# Patient Record
Sex: FEMALE | Race: BLACK OR AFRICAN AMERICAN | Employment: FULL TIME | URBAN - METROPOLITAN AREA
[De-identification: names, ages, dates, MRNs, and addresses within clinical notes are randomized per-mention and may not be internally consistent; named-entity substitution may affect disease eponyms.]

---

## 2021-03-31 ENCOUNTER — HOSPITAL ENCOUNTER (EMERGENCY)
Age: 36
Discharge: HOME | End: 2021-03-31
Attending: EMERGENCY MEDICINE
Payer: COMMERCIAL

## 2021-03-31 DIAGNOSIS — G50.1 ATYPICAL FACE PAIN: Primary | ICD-10-CM

## 2021-03-31 DIAGNOSIS — K08.89 DENTALGIA: ICD-10-CM

## 2021-03-31 PROCEDURE — 99281 EMR DPT VST MAYX REQ PHY/QHP: CPT

## 2021-03-31 RX ORDER — HYDROCODONE BITARTRATE AND ACETAMINOPHEN 5; 325 MG/1; MG/1
1 TABLET ORAL
Qty: 8 TAB | Refills: 0 | Status: SHIPPED | OUTPATIENT
Start: 2021-03-31 | End: 2021-04-03

## 2021-03-31 RX ORDER — IBUPROFEN 600 MG/1
600 TABLET ORAL
Qty: 15 TAB | Refills: 0 | Status: SHIPPED | OUTPATIENT
Start: 2021-03-31 | End: 2022-01-20

## 2021-03-31 RX ORDER — CLINDAMYCIN HYDROCHLORIDE 300 MG/1
300 CAPSULE ORAL 4 TIMES DAILY
Qty: 40 CAP | Refills: 0 | Status: SHIPPED | OUTPATIENT
Start: 2021-03-31 | End: 2021-04-10

## 2021-03-31 NOTE — LETTER
[address] 
[facility] EMERGENCY DEPT 
[address] [place] [id number] [phone] Work/School Note Date: 3/31/2021 To Whom It May concern: 
 
 
Hermelinda Joshua was seen and treated today in the emergency room. She may return to work in 1 to 2 days, as symptoms improve. Sincerely, Adele Curtis

## 2021-03-31 NOTE — ED PROVIDER NOTES
EMERGENCY DEPARTMENT HISTORY AND PHYSICAL EXAM      Date: 3/31/2021  Patient Name: Fabian Alvarez    History of Presenting Illness     Chief Complaint   Patient presents with    Dental Pain     left upper quadrant onset weeks- has appt with dental on Friday       History Provided By: Patient    HPI: Fabian Alvarez, 31 y.o. female presents ambulatory to the Emergency Dept with c/o dental pain to the L upper molar region since Friday, 3/26/21. The patient denied h/o problems with this tooth in the past. She states she has an appt with her dentist for this Friday, but could no longer tolerate the pain. She denied facial swelling. No bleeding/drainage from the tooth. No fever/chills. She denied difficulty swallowing/breathing. Pt denied tobacco use. She described her pain as an acute throbbing, ache and rated it as severe. Pt is o/w healthy without cough, congestion, ST, shortness of breath, chest pain, N/V/D. There are no other complaints, changes, or physical findings at this time. PCP: None    Current Outpatient Medications   Medication Sig Dispense Refill    clindamycin (CLEOCIN) 300 mg capsule Take 1 Cap by mouth four (4) times daily for 10 days. 40 Cap 0    HYDROcodone-acetaminophen (NORCO) 5-325 mg per tablet Take 1 Tab by mouth every eight (8) hours as needed for Pain for up to 3 days. Max Daily Amount: 3 Tabs. 8 Tab 0    ibuprofen (MOTRIN) 600 mg tablet Take 1 Tab by mouth every six (6) hours as needed for Pain for up to 15 doses. 15 Tab 0    predniSONE (STERAPRED DS) 10 mg dose pack TAD. 21 Tab 0    albuterol (PROVENTIL, VENTOLIN) 90 mcg/actuation inhaler Take 1-2 Puffs by inhalation every four (4) hours as needed for Wheezing.  17 g 0       Past History     Past Medical History:  Past Medical History:   Diagnosis Date    Asthma     Other ill-defined conditions(869.89)     eczema       Past Surgical History:  Past Surgical History:   Procedure Laterality Date    MO ABDOMEN SURGERY PROC UNLISTED      hernia       Family History:  History reviewed. No pertinent family history. Social History:  Social History     Tobacco Use    Smoking status: Never Smoker   Substance Use Topics    Alcohol use: No    Drug use: No       Allergies:  No Known Allergies      Review of Systems   Review of Systems   Constitutional: Negative for chills and fever. HENT: Positive for dental problem. Negative for congestion, drooling, ear pain, facial swelling, rhinorrhea, sore throat and trouble swallowing. Eyes: Negative for discharge, redness and itching. Respiratory: Negative for cough and shortness of breath. Cardiovascular: Negative for chest pain and palpitations. Gastrointestinal: Negative for abdominal pain, diarrhea, nausea and vomiting. Genitourinary: Negative for dysuria and hematuria. Musculoskeletal: Negative for neck pain and neck stiffness. Skin: Negative for color change, pallor, rash and wound. Allergic/Immunologic: Negative for food allergies and immunocompromised state. Neurological: Negative for dizziness and headaches. Hematological: Negative for adenopathy. Does not bruise/bleed easily. Psychiatric/Behavioral: Negative for agitation and confusion. All other systems reviewed and are negative. Physical Exam   Physical Exam  Vitals signs and nursing note reviewed. Constitutional:       General: She is not in acute distress. Appearance: She is well-developed. She is obese. She is not ill-appearing, toxic-appearing or diaphoretic. HENT:      Head: Normocephalic and atraumatic. Nose: Nose normal. No congestion or rhinorrhea. Mouth/Throat:      Mouth: Mucous membranes are moist.      Pharynx: No oropharyngeal exudate. Comments: Pt with poor overall dental health, multiple dental caries, decayed/missing teeth. Tender to L upper molar which was notably decayed. No gingival erythema, edema, exudate, or bleeding noted.   Eyes:      General: No scleral icterus. Right eye: No discharge. Left eye: No discharge. Conjunctiva/sclera: Conjunctivae normal.   Neck:      Musculoskeletal: Normal range of motion and neck supple. Thyroid: No thyromegaly. Vascular: No JVD. Trachea: No tracheal deviation. Cardiovascular:      Rate and Rhythm: Normal rate and regular rhythm. Heart sounds: Normal heart sounds. Pulmonary:      Effort: Pulmonary effort is normal. No respiratory distress. Breath sounds: Normal breath sounds. No wheezing. Musculoskeletal: Normal range of motion. General: No deformity. Lymphadenopathy:      Cervical: No cervical adenopathy. Skin:     General: Skin is warm and dry. Findings: No erythema. Neurological:      General: No focal deficit present. Mental Status: She is alert and oriented to person, place, and time. Sensory: No sensory deficit. Motor: No abnormal muscle tone. Coordination: Coordination normal.   Psychiatric:         Mood and Affect: Mood normal.         Behavior: Behavior normal.         Judgment: Judgment normal.         Diagnostic Study Results     Labs -   No results found for this or any previous visit (from the past 12 hour(s)). Radiologic Studies -   No orders to display         Medical Decision Making   I am the first provider for this patient. I reviewed the vital signs, available nursing notes, past medical history, past surgical history, family history and social history. Vital Signs-Reviewed the patient's vital signs. No data found. Records Reviewed: Nursing Notes, Old Medical Records, Previous Radiology Studies and Previous Laboratory Studies    Provider Notes (Medical Decision Making):   Dental caries, dental abscess, exposed nerve root    ED Course:   Initial assessment performed. The patients presenting problems have been discussed, and they are in agreement with the care plan formulated and outlined with them.   I have encouraged them to ask questions as they arise throughout their visit. DISCHARGE NOTE:  The care plan has been outline with the patient and/or family, who verbally conveyed understanding and agreement. Available results have been reviewed. Patient and/or family understand the follow up plan as outlined and discharge instructions. Should their condition deterioration at any time after discharge the patient agrees to return, follow up sooner than outlined or seek medical assistance at the closest Emergency Room as soon as possible. Questions have been answered. Discharge instructions and educational information regarding the patient's diagnosis as well a list of reasons why the patient would want to seek immediate medical attention, should their condition change, were reviewed directly with the patient/family        PLAN:  1. Discharge Medication List as of 3/31/2021  5:49 PM      START taking these medications    Details   clindamycin (CLEOCIN) 300 mg capsule Take 1 Cap by mouth four (4) times daily for 10 days. , Normal, Disp-40 Cap, R-0      HYDROcodone-acetaminophen (NORCO) 5-325 mg per tablet Take 1 Tab by mouth every eight (8) hours as needed for Pain for up to 3 days. Max Daily Amount: 3 Tabs., Normal, Disp-8 Tab, R-0      ibuprofen (MOTRIN) 600 mg tablet Take 1 Tab by mouth every six (6) hours as needed for Pain for up to 15 doses. , Normal, Disp-15 Tab, R-0         CONTINUE these medications which have NOT CHANGED    Details   predniSONE (STERAPRED DS) 10 mg dose pack TAD., Print, Disp-21 Tab, R-0      albuterol (PROVENTIL, VENTOLIN) 90 mcg/actuation inhaler Take 1-2 Puffs by inhalation every four (4) hours as needed for Wheezing. Print, 1-2 Puff, Disp-17 g, R-0           2.    Follow-up Information     Follow up With Specialties Details Why Contact Info    your dentist        [facility] EMERGENCY DEPT Emergency Medicine  If symptoms worsen [address]  [address] [facility]  [phone]        Return to ED if worse     Diagnosis     Clinical Impression:   1. Atypical face pain    2.  Levie Wilson

## 2021-03-31 NOTE — DISCHARGE INSTRUCTIONS
Complete all of your antibiotics as prescribed. Follow up with Dentist as scheduled. Return to the Emergency Dept for any worsening pain, swelling, fever, difficulty swallowing/breathing.

## 2021-05-21 ENCOUNTER — HOSPITAL ENCOUNTER (EMERGENCY)
Age: 36
Discharge: HOME | End: 2021-05-21
Attending: EMERGENCY MEDICINE
Payer: COMMERCIAL

## 2021-05-21 VITALS
TEMPERATURE: 98.6 F | BODY MASS INDEX: 50.61 KG/M2 | WEIGHT: 268.08 LBS | OXYGEN SATURATION: 100 % | RESPIRATION RATE: 18 BRPM | HEART RATE: 100 BPM | HEIGHT: 61 IN

## 2021-05-21 DIAGNOSIS — K08.89 DENTALGIA: Primary | ICD-10-CM

## 2021-05-21 PROCEDURE — 99283 EMERGENCY DEPT VISIT LOW MDM: CPT

## 2021-05-21 PROCEDURE — 74011250637 HC RX REV CODE- 250/637: Performed by: PHYSICIAN ASSISTANT

## 2021-05-21 PROCEDURE — 74011000250 HC RX REV CODE- 250: Performed by: PHYSICIAN ASSISTANT

## 2021-05-21 RX ORDER — PENICILLIN V POTASSIUM 500 MG/1
500 TABLET, FILM COATED ORAL 4 TIMES DAILY
Qty: 28 TABLET | Refills: 0 | Status: SHIPPED | OUTPATIENT
Start: 2021-05-21 | End: 2021-05-28

## 2021-05-21 RX ADMIN — BENZOCAINE, BUTAMBEN, AND TETRACAINE HYDROCHLORIDE: .028; .004; .004 AEROSOL, SPRAY TOPICAL at 09:36

## 2021-05-21 NOTE — ED PROVIDER NOTES
EMERGENCY DEPARTMENT HISTORY AND PHYSICAL EXAM      Date: 5/21/2021  Patient Name: Kaylee Franklin    History of Presenting Illness     Chief Complaint   Patient presents with    Dental Pain     Terrible toothache - top left       History Provided By: Patient    HPI: Kaylee Franklin, 31 y.o. female with PMHx of asthma, eczema, presents BIB self to the ED with cc of dental pain since yesterday. Pain is located at the L upper molar, 2nd from the back. Pain  Is described as sharp, worse to the touch and with cold temperatures. Pt denies fever, drainage, swelling, difficulty swallowing. Dentist is [facility] and pt has an appt scheduled in 6 days. No improvement with ibuprofen x2 this morning at 6:30. There are no other complaints, changes, or physical findings at this time. PCP: None    No current facility-administered medications on file prior to encounter. Current Outpatient Medications on File Prior to Encounter   Medication Sig Dispense Refill    ibuprofen (MOTRIN) 600 mg tablet Take 1 Tab by mouth every six (6) hours as needed for Pain for up to 15 doses. 15 Tab 0    predniSONE (STERAPRED DS) 10 mg dose pack TAD. 21 Tab 0    albuterol (PROVENTIL, VENTOLIN) 90 mcg/actuation inhaler Take 1-2 Puffs by inhalation every four (4) hours as needed for Wheezing. 17 g 0       Past History     Past Medical History:  Past Medical History:   Diagnosis Date    Asthma     Other ill-defined conditions(799.89)     eczema       Past Surgical History:  Past Surgical History:   Procedure Laterality Date    AR ABDOMEN SURGERY PROC UNLISTED      hernia       Family History:  No family history on file. Social History:  Social History     Tobacco Use    Smoking status: Never Smoker   Substance Use Topics    Alcohol use: No    Drug use: No       Allergies:  No Known Allergies      Review of Systems   Review of Systems   Constitutional: Negative for fever. HENT: Negative for trouble swallowing. Dental pain   Gastrointestinal: Negative for vomiting. Physical Exam   Physical Exam  Vitals and nursing note reviewed. Constitutional:       General: She is not in acute distress. Appearance: Normal appearance. She is well-developed. HENT:      Head: Normocephalic and atraumatic. Comments: TTP over tooth #15. No appreciable fluctuance, edema, or erythema. Airway patent, sublingual region soft. Right Ear: Tympanic membrane normal.      Left Ear: Tympanic membrane normal.      Nose: Nose normal.      Mouth/Throat:      Mouth: Mucous membranes are moist.   Eyes:      General: Lids are normal.      Extraocular Movements: Extraocular movements intact. Conjunctiva/sclera: Conjunctivae normal.      Pupils: Pupils are equal, round, and reactive to light. Cardiovascular:      Rate and Rhythm: Normal rate and regular rhythm. Pulmonary:      Effort: Pulmonary effort is normal.      Breath sounds: Normal breath sounds. Abdominal:      Palpations: Abdomen is soft. Musculoskeletal:         General: Normal range of motion. Cervical back: Normal range of motion and neck supple. Skin:     General: Skin is warm and dry. Neurological:      General: No focal deficit present. Mental Status: She is alert and oriented to person, place, and time. Psychiatric:         Mood and Affect: Mood normal.         Behavior: Behavior normal. Behavior is cooperative. Diagnostic Study Results     Labs -   No results found for this or any previous visit (from the past 12 hour(s)). Radiologic Studies -   No orders to display     CT Results  (Last 48 hours)    None        CXR Results  (Last 48 hours)    None            Medical Decision Making   I am the first provider for this patient. I reviewed the vital signs, available nursing notes, past medical history, past surgical history, family history and social history. Vital Signs-Reviewed the patient's vital signs.   Patient Vitals for the past 12 hrs:   Temp Pulse Resp SpO2   05/21/21 0914 98.6 °F (37 °C) 100 18 100 %       Records Reviewed: Nursing Notes    Provider Notes (Medical Decision Making):   No appreciable abscess. Pt d/c home with a dental ball, course of Ren. Advised pt to keep dentist appt as scheduled for definitive tx. ED return precautions given. ED Course:   Initial assessment performed. The patients presenting problems have been discussed, and they are in agreement with the care plan formulated and outlined with them. I have encouraged them to ask questions as they arise throughout their visit. Critical Care Time: None    Disposition:  D/c    PLAN:  1. Current Discharge Medication List      START taking these medications    Details   penicillin v potassium (VEETID) 500 mg tablet Take 1 Tablet by mouth four (4) times daily for 7 days. Qty: 28 Tablet, Refills: 0  Start date: 5/21/2021, End date: 5/28/2021           2. Follow-up Information     Follow up With Specialties Details Why Contact Info    [facility] EMERGENCY DEPT Emergency Medicine  If symptoms worsen [address]  [address] [facility]  [phone]    Your dentist  Call  For follow up as scheduled         Return to ED if worse     Diagnosis     Clinical Impression:   1. Dentalgia          Please note that this dictation was completed with [employer], the computer voice recognition software. Quite often unanticipated grammatical, syntax, homophones, and other interpretive errors are inadvertently transcribed by the computer software. Please disregards these errors. Please excuse any errors that have escaped final proofreading.

## 2022-01-20 ENCOUNTER — HOSPITAL ENCOUNTER (EMERGENCY)
Age: 37
Discharge: HOME | End: 2022-01-20
Attending: STUDENT IN AN ORGANIZED HEALTH CARE EDUCATION/TRAINING PROGRAM
Payer: COMMERCIAL

## 2022-01-20 ENCOUNTER — APPOINTMENT (OUTPATIENT)
Dept: GENERAL RADIOLOGY | Age: 37
End: 2022-01-20
Attending: PHYSICIAN ASSISTANT
Payer: COMMERCIAL

## 2022-01-20 VITALS
DIASTOLIC BLOOD PRESSURE: 79 MMHG | SYSTOLIC BLOOD PRESSURE: 144 MMHG | WEIGHT: 270.5 LBS | BODY MASS INDEX: 51.07 KG/M2 | HEART RATE: 91 BPM | TEMPERATURE: 97.8 F | HEIGHT: 61 IN | OXYGEN SATURATION: 100 % | RESPIRATION RATE: 16 BRPM
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
## 2022-01-20 DIAGNOSIS — M62.838 MUSCLE SPASM OF LEFT SHOULDER: ICD-10-CM

## 2022-01-20 DIAGNOSIS — M62.838 MUSCLE SPASM OF RIGHT SHOULDER: ICD-10-CM
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
## 2022-01-20 DIAGNOSIS — S83.8X1A SPRAIN OF OTHER LIGAMENT OF RIGHT KNEE, INITIAL ENCOUNTER: Primary | ICD-10-CM

## 2022-01-20 PROCEDURE — 99281 EMR DPT VST MAYX REQ PHY/QHP: CPT

## 2022-01-20 PROCEDURE — 73562 X-RAY EXAM OF KNEE 3: CPT

## 2022-01-20 RX ORDER — NAPROXEN 500 MG/1
500 TABLET ORAL
Qty: 20 TABLET | Refills: 0 | Status: SHIPPED | OUTPATIENT
Start: 2022-01-20 | End: 2022-03-16

## 2022-01-20 NOTE — Clinical Note
[address]  \[facility]\"" EMERGENCY DEPT  [address] [facility]  [place] [address]  [phone]    Work/School Note    Date: 1/20/2022    To Whom It May concern:    Mckenzie Oropeza was seen and treated today in the emergency room by the following provider(s):  Attending Provider: Bryant Sepulveda MD  Physician Assistant: Keely Mathis Rubina is excused from work/school on 01/20/22 and 01/21/22. She is medically clear to return to work/school on 1/22/2022.        Sincerely,          Adele Taylor

## 2022-01-20 NOTE — DISCHARGE INSTRUCTIONS
It was a pleasure taking care of you at [facility] Emergency Department today. We know that when you come to [facility], you are entrusting us with your health, comfort, and safety. Our physicians and nurses honor that trust, and we truly appreciate the opportunity to care for you and your loved ones. We also value your feedback. If you receive a survey about your Emergency Department experience today, please fill it out. We care about our patients' feedback, and we listen to what you have to say. Thank you!

## 2022-01-20 NOTE — ED PROVIDER NOTES
EMERGENCY DEPARTMENT HISTORY AND PHYSICAL EXAM      Date: 1/20/2022  Patient Name: Karina Alejo    History of Presenting Illness     Chief Complaint   Patient presents with    Fall     Pt arrives via triage from home for complaints of right knee pain after a fall yesterday. 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  Knee Injury       History Provided By: Patient    HPI: Karina Alejo, 31 y.o. female with significant PMHx for asthma, presents by POV  to the ED with cc of right knee pain from a GLF that occurred yesterday. She was attempting to stop a client from falling and they both fell. She has pain to the right knee and bilateral shoulders. The pain has been constant since yesterday and is non-radiating. She took Tylenol with relief. She denies a Hx of prior knee or shoulder pain. There are no other complaints, changes, or physical findings at this time. Social Hx: Tobacco (denies), EtOH (denies), Illicit drug use (denies)     PCP: None    No current facility-administered medications on file prior to encounter. Current Outpatient Medications on File Prior to Encounter   Medication Sig Dispense Refill    [DISCONTINUED] ibuprofen (MOTRIN) 600 mg tablet Take 1 Tab by mouth every six (6) hours as needed for Pain for up to 15 doses. (Patient not taking: Reported on 1/20/2022) 15 Tab 0    [DISCONTINUED] predniSONE (STERAPRED DS) 10 mg dose pack TAD. (Patient not taking: Reported on 1/20/2022) 21 Tab 0    [DISCONTINUED] albuterol (PROVENTIL, VENTOLIN) 90 mcg/actuation inhaler Take 1-2 Puffs by inhalation every four (4) hours as needed for Wheezing.  17 g 0       Past History     Past Medical History:  Past Medical History:   Diagnosis Date    Asthma     Other ill-defined conditions(639.89)     eczema       Past Surgical History:  Past Surgical History:   Procedure Laterality Date    MD ABDOMEN SURGERY PROC UNLISTED      hernia       Family History:  No family history on file. Social History:  Social History     Tobacco Use    Smoking status: Never Smoker    Smokeless tobacco: Not on file   Substance Use Topics    Alcohol use: No    Drug use: No       Allergies:  No Known Allergies      Review of Systems   Review of Systems   Constitutional: Negative for chills, diaphoresis and fever. HENT: Negative for congestion, ear pain, rhinorrhea and sore throat. Respiratory: Negative for cough and shortness of breath. Cardiovascular: Negative for chest pain. Gastrointestinal: Negative for abdominal pain, constipation, diarrhea, nausea and vomiting. Genitourinary: Negative for difficulty urinating, dysuria, frequency and hematuria. Musculoskeletal: Positive for arthralgias. Negative for myalgias. Neurological: Negative for headaches. All other systems reviewed and are negative. Physical Exam   Physical Exam  Vitals and nursing note reviewed. Constitutional:       General: She is not in acute distress. Appearance: She is well-developed. She is obese. She is not diaphoretic. Comments: 31 y.o. -American female    HENT:      Head: Normocephalic and atraumatic. Eyes:      General:         Right eye: No discharge. Left eye: No discharge. Conjunctiva/sclera: Conjunctivae normal.   Cardiovascular:      Rate and Rhythm: Normal rate and regular rhythm. Pulses: Normal pulses. Heart sounds: Normal heart sounds. No murmur heard. Pulmonary:      Effort: Pulmonary effort is normal. No respiratory distress. Breath sounds: Normal breath sounds. Musculoskeletal:      Cervical back: Normal range of motion and neck supple. Comments: R KNEE: Small anterior contusion. No swelling, ecchymosis, effusion or deformity. FROM. No crepitus or laxity. Ambulatory without limp. SHOULDERS: No swelling, ecchymosis, deformity or TTP. FROM. Distal NV intact. Skin:     General: Skin is warm and dry.    Neurological: Mental Status: She is alert and oriented to person, place, and time. Psychiatric:         Behavior: Behavior normal.         Diagnostic Study Results     Labs - none    Radiologic Studies -   XR KNEE RT 3 V   Final Result   No acute fracture or dislocation. The above xray(s) have been interpreted independently by me and I agree with the radiologists findings above. Medical Decision Making   I am the first provider for this patient. I reviewed the vital signs, available nursing notes, past medical history, past surgical history, family history and social history. Vital Signs-Reviewed the patient's vital signs. Patient Vitals for the past 12 hrs:   Temp Pulse Resp BP SpO2   01/20/22 0833 97.8 °F (36.6 °C) 91 16 (!) 144/79 100 %       Records Reviewed: Nursing Notes    Provider Notes (Medical Decision Making): The patient presents the ED with stable vital signs for orthopedic complaints. Differential diagnosis include fracture, sprain, strain, bursitis, tendinitis, spasm, arthritis. X-rays of her right knee are without acute issues. We will treat patient with anti-inflammatories. She should follow-up with orthopedics if not improving but can return to the ED. ED Course:   Initial assessment performed. The patients presenting problems have been discussed, and they are in agreement with the care plan formulated and outlined with them. I have encouraged them to ask questions as they arise throughout their visit. Critical Care Time: None    Disposition:  DISCHARGE NOTE:  9:30 AM  The pt is ready for discharge. The pt's signs, symptoms, diagnosis, and discharge instructions have been discussed and pt has conveyed their understanding. The pt is to follow up as recommended or return to ER should their symptoms worsen. Plan has been discussed and pt is in agreement. PLAN:  1.    Current Discharge Medication List      START taking these medications    Details   naproxen (NAPROSYN) 500 mg tablet Take 1 Tablet by mouth every twelve (12) hours as needed for Pain. Qty: 20 Tablet, Refills: 0  Start date: 1/20/2022           2. Follow-up Information     Follow up With Specialties Details Why Contact Info    Macarena Daniels MD Orthopedic Surgery, Hand Surgery In 1 week As needed, If not improved [address] [facility]  [address]  [address] [place] [address] EMERGENCY DEPT Emergency Medicine  If symptoms worsen [address]  [address] [facility]  [phone]        Return to ED if worse     Diagnosis     Clinical Impression:   1. Sprain of other ligament of right knee, initial encounter    2. Muscle spasm of left shoulder    3. Muscle spasm of right shoulder    4. Fall, initial encounter    5. Work related injury          Please note that this dictation was completed with [employer], the computer voice recognition software. Quite often unanticipated grammatical, syntax, homophones, and other interpretive errors are inadvertently transcribed by the computer software. Please disregards these errors. Please excuse any errors that have escaped final proofreading.

## 2022-01-20 NOTE — Clinical Note
[address]  [facility] EMERGENCY DEPT  [address] [place] [address]  Saima Allen [address] [phone]    Work/School Note    Date: 1/20/2022    To Whom It May concern:      Parvez Camacho was seen and treated today in the emergency room by the following provider(s):  Attending Provider: Lynne Alvarez MD  Physician Assistant: Danielle Kraft, Keely Rhodesjohn Bauman is excused from work/school on 01/20/22. She is clear to return to work/school on 01/21/22.         Sincerely,          Adele Baugh

## 2022-03-16 ENCOUNTER — HOSPITAL ENCOUNTER (EMERGENCY)
Age: 37
Discharge: HOME | End: 2022-03-16
Attending: EMERGENCY MEDICINE
Payer: COMMERCIAL

## 2022-03-16 VITALS
WEIGHT: 268.3 LBS | TEMPERATURE: 98.7 F | DIASTOLIC BLOOD PRESSURE: 75 MMHG | OXYGEN SATURATION: 98 % | RESPIRATION RATE: 18 BRPM | HEART RATE: 113 BPM | BODY MASS INDEX: 50.66 KG/M2 | SYSTOLIC BLOOD PRESSURE: 121 MMHG | HEIGHT: 61 IN

## 2022-03-16 DIAGNOSIS — J06.9 ACUTE UPPER RESPIRATORY INFECTION: Primary | ICD-10-CM

## 2022-03-16 DIAGNOSIS — R05.9 COUGH: ICD-10-CM

## 2022-03-16 LAB
FLUAV AG NPH QL IA: NEGATIVE
FLUBV AG NOSE QL IA: NEGATIVE

## 2022-03-16 PROCEDURE — 99283 EMERGENCY DEPT VISIT LOW MDM: CPT

## 2022-03-16 PROCEDURE — U0005 INFEC AGEN DETEC AMPLI PROBE: HCPCS

## 2022-03-16 PROCEDURE — 87804 INFLUENZA ASSAY W/OPTIC: CPT

## 2022-03-16 RX ORDER — BENZONATATE 100 MG/1
100 CAPSULE ORAL
Qty: 21 CAPSULE | Refills: 0 | Status: SHIPPED | OUTPATIENT
Start: 2022-03-16 | End: 2022-03-23

## 2022-03-16 NOTE — DISCHARGE INSTRUCTIONS
Start Tylenol as needed for fever or bodyaches  Start Tessalon perles as needed for cough  Follow up on COVID results  Rest, increase fluids  Follow up with PCP, return to ED for any new or worsening symptoms.

## 2022-03-16 NOTE — Clinical Note
[address]  [facility] EMERGENCY DEPT  [address] [facility]  [place] [id number] [phone]    Work/School Note    Date: 3/16/2022     To Whom It May concern:    Marbin Decker was evaluated by the following provider(s):  Attending Provider: Delon Oliver MD  Physician Assistant: BRISEYDA Camp.   COVID19 virus is suspected. Per the CDC guidelines we recommend home isolation until the following conditions are all met:    1. At least five days have passed since symptoms first appeared and/or had a close exposure,   2. After home isolation for five days, wearing a mask around others for the next five days,  3. At least 24 have passed since last fever without the use of fever-reducing medications and  4.  Symptoms (eg cough, shortness of breath) have improved      Sincerely,          Kendal Lara, [address]

## 2022-03-16 NOTE — ED NOTES
Assumed care of patient. Pt resting in position of comfort. Call bell within reach. Pt reports body aches, fevers, non productive cough x 2 days. Her job requires her to test for covid every Monday and Wednesday. Monday her covid test was negative. Pt is vaccinated for covid.  Son recently had cough but no other s/s

## 2022-03-16 NOTE — Clinical Note
[address]  [facility] EMERGENCY DEPT  [address]  [place] [address]  [phone]    Work/School Note    Date: 3/16/2022     To Whom It May concern:    Brooklyn Mckay was evaluated by the following provider(s):  Attending Provider: Wilfredo Ernandez MD  Physician Assistant: BRISEYDA Bryan.   COVID19 virus is suspected. Per the CDC guidelines we recommend home isolation until the following conditions are all met:    1. At least five days have passed since symptoms first appeared and/or had a close exposure,   2. After home isolation for five days, wearing a mask around others for the next five days,  3. At least 24 have passed since last fever without the use of fever-reducing medications and  4.  Symptoms (eg cough, shortness of breath) have improved      Sincerely,          Adele Adan

## 2022-03-16 NOTE — ED PROVIDER NOTES
EMERGENCY DEPARTMENT HISTORY AND PHYSICAL EXAM      Date: 3/16/2022  Patient Name: Mario Josue    History of Presenting Illness     Chief Complaint   Patient presents with    Cough     since yesterday, nonproductive    Fever     up to 102 at home, took Tylenol PTA; reports body aches    Generalized Body Aches     since yesterday       History Provided By: Patient    HPI: Mario Josue, 30 y.o. female presents to the ED with cc of non-productive cough and fever for 2 days. The patient works at [employer] and rehab and is worried for possible Covid exposure. She took a test Monday that was negative. Associated symptoms include myalgias, headache and a sore throat that has since resolved. The patient took Tylenol, last dose was at 10:30 AM.  The patient denies chest pain, shortness of breath, abdominal pain, nausea, vomiting, diarrhea, dysuria, hematuria, rash, and chance of pregnancy. PMH: pt denies  Allergies: seasonal. NKMA  Immunization: + COVID, denies influenza  SH: patient denies tobacco, alcohol, and illicit drug use    There are no other complaints, changes, or physical findings at this time. PCP: None    No current facility-administered medications on file prior to encounter. Current Outpatient Medications on File Prior to Encounter   Medication Sig Dispense Refill    [DISCONTINUED] naproxen (NAPROSYN) 500 mg tablet Take 1 Tablet by mouth every twelve (12) hours as needed for Pain. 20 Tablet 0       Past History     Past Medical History:  Past Medical History:   Diagnosis Date    Asthma     Other ill-defined conditions(799.89)     eczema       Past Surgical History:  Past Surgical History:   Procedure Laterality Date    WA ABDOMEN SURGERY PROC UNLISTED      hernia       Family History:  History reviewed. No pertinent family history.     Social History:  Social History     Tobacco Use    Smoking status: Never Smoker    Smokeless tobacco: Never Used   Substance Use Topics    [facility]    Daily progress Note    Patient: Ana Wyatt MRN: [id number]  CSN: [id number]    YOB: 1931  Age: 82 y.o. Sex: male    DOA: 12/12/2019 LOS:  LOS: 7 days                    Subjective:     CC: hypokalemia   Mr. Blayne Cervantes is a 82 yr old male patient. Patient was recently hospitalized from 12/7/2019-12/12/2019. Patient was seen in the ER for eval of generalized weakness, more than usual. Patient was found hypokalemia with k at 2.9 and  ,000 COLONIES/mL KLEBSIELLA PNEUMONIAE. Patient reports diarrhea times 3 weeks. Patient was tx with IVx abs and his K was replaced. Patient with hx of multiple falls possible from neuropathy. He was seen by PT/OT and they recommended PT/OT. Patient was accept at [facility]. On admission his K was 4.1. K now 3.4, he was given additional potassium, he remains asymatmtic. Patient report last Bm was y/d. He was started on probiotic and Metamucil, discussed with nursing no stool today. Discussed with PT, he is at his prior level of functioning. Discussed with sister and patient at bedside, informed that K is now 3.0, will give additional doses of k-dur and do follow lab in AM. No fever or chills.  He remains asymptomatic.          PAST MEDICAL Hx: Parkinson, DM type 2, UTI, HLD, HTN        PAST SURGICAL HISTORY:  Cardiac catheterization, GreenLight surgery.     ALLERGIES:  NO KNOWN DRUG     CURRENT MEDICATIONS:  Medication list reviewed.     SOCIAL HISTORY:  The patient , nonsmoker, nondrinker.     FAMILY HISTORY:  Positive for heart disease and hypertension.     REVIEW OF SYSTEMS:  No fever or chills.  No weight loss or headache.  No neck pain or sore throat.  No chest pain or palpitation.  No shortness of breath or cough.  No nausea, vomiting, diarrhea, dysuria, or polyuria.  No back pain or leg pain.  No heat or cold intolerance.  No anxiety or depression.       Objective:      Visit Vitals  /69 (BP Patient Position: Lying left side) Alcohol use: No    Drug use: No       Allergies:  No Known Allergies      Review of Systems   Review of Systems   Constitutional: Positive for fever. HENT: Negative. Negative for congestion. Eyes: Negative. Negative for pain. Respiratory: Positive for cough. Negative for chest tightness and shortness of breath. Cardiovascular: Negative. Negative for chest pain and palpitations. Gastrointestinal: Negative. Negative for abdominal pain, diarrhea, nausea and vomiting. Genitourinary: Negative. Negative for dysuria and hematuria. Musculoskeletal: Positive for myalgias. Negative for arthralgias. Skin: Negative. Negative for color change. Allergic/Immunologic: Positive for environmental allergies (seasonal). Negative for food allergies. Neurological: Positive for headaches. Psychiatric/Behavioral: Negative. Physical Exam   Physical Exam  Vitals reviewed. Constitutional:       General: She is not in acute distress. Appearance: She is well-developed. She is not diaphoretic. Comments: Pt appears well, awake and alert in NAD. HENT:      Head: Normocephalic and atraumatic. Right Ear: Tympanic membrane, ear canal and external ear normal.      Left Ear: Tympanic membrane, ear canal and external ear normal.      Nose: Nose normal.      Mouth/Throat:      Pharynx: Oropharynx is clear. No pharyngeal swelling, oropharyngeal exudate or posterior oropharyngeal erythema. Eyes:      General:         Right eye: No discharge. Left eye: No discharge. Conjunctiva/sclera: Conjunctivae normal.      Pupils: Pupils are equal, round, and reactive to light. Cardiovascular:      Rate and Rhythm: Normal rate. Heart sounds: Normal heart sounds. Pulmonary:      Effort: Pulmonary effort is normal. No respiratory distress. Breath sounds: Normal breath sounds. No wheezing or rales.       Comments: Mild dry cough  Abdominal:      General: Bowel sounds are normal. Pulse 71   Temp 97 °F (36.1 °C)   Resp 17   Ht 6' 1\" (1.854 m)   Wt 90.7 kg (200 lb)   SpO2 95%   BMI 26.39 kg/m²       Physical Exam:  General appearance: alert, cooperative, no distress, appears stated age  [de-identified]: negative  Neck: supple, symmetrical, trachea midline, no adenopathy, thyroid: not enlarged, symmetric, no tenderness/mass/nodules, no carotid bruit and no JVD  Lungs: clear to auscultation bilaterally  Heart: regular rate and rhythm, S1, S2 normal, no murmur, click, rub or gallop  Abdomen: soft, non-tender. Bowel sounds normal. No masses,  no organomegaly  Pulses: 2+ and symmetric  Skin: Skin color, texture, turgor normal. No rashes or lesions      Intake and Output:  Current Shift:  No intake/output data recorded. Last three shifts:  No intake/output data recorded. Recent Results (from the past 24 hour(s))   GLUCOSE, POC    Collection Time: 12/18/19  3:50 PM   Result Value Ref Range    Glucose (POC) 127 (H) 70 - 110 mg/dL   GLUCOSE, POC    Collection Time: 12/18/19  8:42 PM   Result Value Ref Range    Glucose (POC) 121 (H) 70 - 110 mg/dL   GLUCOSE, POC    Collection Time: 12/19/19  4:15 AM   Result Value Ref Range    Glucose (POC) 88 70 - 110 mg/dL   CBC WITH AUTOMATED DIFF    Collection Time: 12/19/19  7:35 AM   Result Value Ref Range    WBC 11.4 4.6 - 13.2 K/uL    RBC 3.74 (L) 4.70 - 5.50 M/uL    HGB 10.8 (L) 13.0 - 16.0 g/dL    HCT 33.9 (L) 36.0 - 48.0 %    MCV 90.6 74.0 - 97.0 FL    MCH 28.9 24.0 - 34.0 PG    MCHC 31.9 31.0 - 37.0 g/dL    RDW 15.5 (H) 11.6 - 14.5 %    PLATELET 810 761 - 495 K/uL    MPV 10.0 9.2 - 11.8 FL    NEUTROPHILS 70 40 - 73 %    LYMPHOCYTES 21 21 - 52 %    MONOCYTES 7 3 - 10 %    EOSINOPHILS 2 0 - 5 %    BASOPHILS 0 0 - 2 %    ABS. NEUTROPHILS 7.9 1.8 - 8.0 K/UL    ABS. LYMPHOCYTES 2.4 0.9 - 3.6 K/UL    ABS. MONOCYTES 0.8 0.05 - 1.2 K/UL    ABS. EOSINOPHILS 0.3 0.0 - 0.4 K/UL    ABS.  BASOPHILS 0.0 0.0 - 0.1 K/UL    DF AUTOMATED     METABOLIC PANEL, BASIC    Collection Palpations: Abdomen is soft. Tenderness: There is no abdominal tenderness. There is no guarding. Musculoskeletal:         General: No tenderness. Normal range of motion. Cervical back: Normal range of motion. Skin:     General: Skin is warm and dry. Coloration: Skin is not pale. Findings: No erythema or rash. Neurological:      General: No focal deficit present. Mental Status: She is alert. Coordination: Coordination normal.      Comments: No focal neuro deficits. Psychiatric:         Behavior: Behavior normal.         Diagnostic Study Results     Labs -   No results found for this or any previous visit (from the past 12 hour(s)). Radiologic Studies -   No orders to display     CT Results  (Last 48 hours)    None        CXR Results  (Last 48 hours)    None          Medical Decision Making   I am the first provider for this patient. I reviewed the vital signs, available nursing notes, past medical history, past surgical history, family history and social history. Vital Signs-Reviewed the patient's vital signs. Patient Vitals for the past 12 hrs:   Temp Pulse Resp BP SpO2   03/16/22 1245 98.7 °F (37.1 °C) (!) 113 18 121/75 98 %         Provider Notes (Medical Decision Making):   Probable viral illness. R/out COVID and influenza    Low suspicion of PNA as longs are CTA B/L  Low suspicion of strep pharyngitis as patient reports ST has resolved. ED Course:   Initial assessment performed. The patients presenting problems have been discussed, and they are in agreement with the care plan formulated and outlined with them. I have encouraged them to ask questions as they arise throughout their visit. Disposition:  2:04PM    DISCHARGE PLAN:  1. Tylenol as needed for fever and myalgias  OTC cough medicine or Tessalon Perles  Discharge Medication List as of 3/16/2022  2:06 PM        2.    Follow-up Information     Follow up With Specialties Details Why Contact Info Time: 12/19/19  7:35 AM   Result Value Ref Range    Sodium 140 136 - 145 mmol/L    Potassium 3.0 (L) 3.5 - 5.5 mmol/L    Chloride 106 100 - 111 mmol/L    CO2 27 21 - 32 mmol/L    Anion gap 7 3.0 - 18 mmol/L    Glucose 88 74 - 99 mg/dL    BUN 26 (H) 7.0 - 18 MG/DL    Creatinine 1.06 0.6 - 1.3 MG/DL    BUN/Creatinine ratio 25 (H) 12 - 20      GFR est AA >60 >60 ml/min/1.73m2    GFR est non-AA >60 >60 ml/min/1.73m2    Calcium 8.4 (L) 8.5 - 10.1 MG/DL   GLUCOSE, POC    Collection Time: 12/19/19 12:11 PM   Result Value Ref Range    Glucose (POC) 138 (H) 70 - 110 mg/dL         Current Facility-Administered Medications:     potassium chloride (K-DUR, KLOR-CON) SR tablet 20 mEq, 20 mEq, Oral, BID, Eli Sanders NP    potassium chloride (K-DUR, KLOR-CON) SR tablet 20 mEq, 20 mEq, Oral, NOW, Eli Sanders NP    megestrol (MEGACE) tablet 20 mg, 20 mg, Oral, DAILY, Kevin Guy MD, 20 mg at 12/19/19 0940    psyllium husk-aspartame (METAMUCIL FIBER) packet 1 Packet, 1 Packet, Oral, BID, Ana PAVON NP, 1 Packet at 12/19/19 1458    magnesium hydroxide (MILK OF MAGNESIA) 400 mg/5 mL oral suspension 30 mL, 30 mL, Oral, DAILY PRN, Dylan Morocho MD, 30 mL at 12/13/19 0604    pentoxifylline CR (TRENTAL) tablet 400 mg, 400 mg, Oral, TID WITH MEALS, Kevin Guy MD, 400 mg at 12/19/19 1214    loperamide (IMODIUM) capsule 2 mg, 2 mg, Oral, DAILY, Dylan Morocho MD, 2 mg at 12/19/19 1425    finasteride (PROSCAR) tablet 5 mg, 5 mg, Oral, DAILY, Dylan Morocho MD, 5 mg at 12/19/19 0940    glycopyrrolate (ROBINUL) tablet 1 mg, 1 mg, Oral, BID, Dylan Morocho MD, 1 mg at 12/19/19 0900    isosorbide mononitrate ER (IMDUR) tablet 60 mg, 60 mg, Oral, DAILY, Kevin Guy MD, 60 mg at 12/19/19 0940    ramipril (ALTACE) capsule 10 mg  (Patient Supplied), 10 mg, Oral, DAILY, Kevin Guy MD    SITagliptin (JANUVIA) tablet 50 mg, 50 mg, Oral, DAILY, Kevin Guy MD, 50 mg at 12/19/19 0940    gabapentin (NEURONTIN) capsule 800 mg, 800 mg, Oral, BID, Kevin Guy MD, 800 mg at 12/19/19 2165    simvastatin (ZOCOR) tablet 20 mg, 20 mg, Oral, QHS, Jeison Barreto MD, 20 mg at 12/18/19 2200    tamsulosin (FLOMAX) capsule 0.4 mg, 0.4 mg, Oral, QPM, Jeison Barreto MD, 0.4 mg at 12/18/19 1709    metoprolol tartrate (LOPRESSOR) tablet 25 mg, 25 mg, Oral, BID, Jeison Barreto MD, 25 mg at 12/19/19 0940    aspirin chewable tablet 81 mg, 81 mg, Oral, DAILY, Jeison Barreto MD, 81 mg at 12/19/19 0940    nitroglycerin (NITROSTAT) tablet 0.4 mg, 0.4 mg, SubLINGual, PRN, Jeison Barreto MD    carbidopa-levodopa ER (SINEMET CR)  mg per tablet 2 Tab, 2 Tab, Oral, TID, Jeison Barreto MD, 2 Tab at 12/19/19 0940    Lactobacillus Acidoph & Bulgar [facility]) tablet 2 Tab, 2 Tab, Oral, BID, Jeison Barreto MD, 2 Tab at 12/19/19 0939    insulin lispro (HUMALOG) injection, , SubCUTAneous, AC&HS, Jeison Barreto MD, Stopped at 12/15/19 1630    dextrose 10 % infusion 125-250 mL, 125-250 mL, IntraVENous, PRN, Jeison Barreot MD    glucagon (GLUCAGEN) injection 1 mg, 1 mg, IntraMUSCular, PRN, Jeison Barreto MD    glucose chewable tablet 16 g, 4 Tab, Oral, PRN, Jeison Barreto MD    Lab Results   Component Value Date/Time    Glucose 88 12/19/2019 07:35 AM    Glucose 113 (H) 12/16/2019 05:35 AM    Glucose 96 12/12/2019 04:25 AM    Glucose 101 (H) 12/11/2019 03:55 AM    Glucose 90 12/10/2019 03:58 AM        Assessment/Plan   Hypokalemia, presumable from diarrhea./ K. K supplement as adjusted, will monitor labs. Diarrhea none reported today, will monitor continue Probiotic. DM type : hgba1c 7.3 on 12/12, contineu SSI  Parkinson: continue PT/OT, continue Sinemet   BPH : continue Flomax + Procar  HLD; continue statin  HTN: family did bring in Altace, BP reviewed , sbp ranging  90-120s, will monitor closely.  Continue Imdur 60 mg +BB 25 mg po bid  CAD: [facility] EMERGENCY DEPT Emergency Medicine  As needed or, If symptoms worsen [address] [place] [address]  [phone]        3. Return to ED if worse     Diagnosis     Clinical Impression:   1. Acute upper respiratory infection    2. Cough        Attestations:    BRISEYDA Philip    Please note that this dictation was completed with [employer], the computer voice recognition software. Quite often unanticipated grammatical, syntax, homophones, and other interpretive errors are inadvertently transcribed by the computer software. Please disregard these errors. Please excuse any errors that have escaped final proofreading. Thank you. continue ASA  Hypokalemia :  No report of diarrhea today, will adjust K-dur 20 meq po bid and give additional  dose of 20 meq po now. Follow up labs     Dispo: plans for home with hh possible in AM, family they all  agree.  I informed Yumiko Mishra NP  12/19/2019, 12:35 PM

## 2022-03-17 ENCOUNTER — PATIENT OUTREACH (OUTPATIENT)
Dept: CASE MANAGEMENT | Age: 37
End: 2022-03-17

## 2022-03-17 LAB
SARS-COV-2, XPLCVT: DETECTED
SOURCE, COVRS: ABNORMAL

## 2022-03-17 NOTE — PROGRESS NOTES
ED follow up cough LM f/u  Patient contacted regarding COVID-19 risk. Discussed COVID-19 related testing which was pending at this time. Test results were pending. Patient informed of results, if available? no.     LPN Care Coordinator contacted the patient by telephone to perform post discharge assessment. Call within 2 business days of discharge: Yes Verified name and  with patient as identifiers. Provided introduction to self, and explanation of the CTN/ACM role, and reason for call due to risk factors for infection and/or exposure to COVID-19. Symptoms reviewed with patient who verbalized the following symptoms: fatigue and cough      Due to no new or worsening symptoms encounter was not routed to provider for escalation. Discussed follow-up appointments. If no appointment was previously scheduled, appointment scheduling offered:  no. [facility] follow up appointment(s): No future appointments. Non-[facility] follow up appointment(s): n/a    Interventions to address risk factors: Education of patient/family/caregiver/guardian to support self-management-VDH and covid numbers given     Advance Care Planning:   Does patient have an Advance Directive: not on file. Educated patient about risk for severe COVID-19 due to risk factors according to CDC guidelines. LPN CC reviewed discharge instructions, medical action plan and red flag symptoms with the patient who verbalized understanding. Discussed COVID vaccination status: yes. Education provided on COVID-19 vaccination as appropriate. Discussed exposure protocols and quarantine with CDC Guidelines. Patient was given an opportunity to verbalize any questions and concerns and agrees to contact LPN CC or health care provider for questions related to their healthcare. Reviewed and educated patient on any new and changed medications related to discharge diagnosis     Was patient discharged with a pulse oximeter? no   LPN CC provided contact information.  Plan for follow-up call in 5-7 days based on severity of symptoms and risk factors.

## 2022-03-17 NOTE — PROGRESS NOTES
Patient's results discussed via telephone.   Patient counseled on quarantine and return precautions to the emergency

## 2022-03-30 ENCOUNTER — PATIENT OUTREACH (OUTPATIENT)
Dept: CASE MANAGEMENT | Age: 37
End: 2022-03-30

## 2022-03-30 NOTE — PROGRESS NOTES
Patient resolved from Transition of Care episode on 3/30/22. ACM/CTN was unsuccessful at contacting this patient today. Patient/family was provided the following resources and education related to COVID-19 during the initial call:                         Signs, symptoms and red flags related to COVID-19            CDC exposure and quarantine guidelines            [facility] exposure contact - [phone]            Contact for their local Department of Health                 Patient has not had any additional ED or hospital visits. No further outreach scheduled with this CTN/ACM. Episode of Care resolved. Patient has this CTN/ACM contact information if future needs arise.

## 2022-04-15 ENCOUNTER — APPOINTMENT (OUTPATIENT)
Dept: GENERAL RADIOLOGY | Age: 37
End: 2022-04-15
Attending: PHYSICIAN ASSISTANT
Payer: COMMERCIAL

## 2022-04-15 ENCOUNTER — HOSPITAL ENCOUNTER (EMERGENCY)
Age: 37
Discharge: HOME | End: 2022-04-15
Attending: EMERGENCY MEDICINE | Admitting: EMERGENCY MEDICINE
Payer: COMMERCIAL

## 2022-04-15 VITALS
TEMPERATURE: 98 F | SYSTOLIC BLOOD PRESSURE: 133 MMHG | OXYGEN SATURATION: 100 % | RESPIRATION RATE: 16 BRPM | HEIGHT: 61 IN | DIASTOLIC BLOOD PRESSURE: 78 MMHG | BODY MASS INDEX: 51.16 KG/M2 | WEIGHT: 270.95 LBS | HEART RATE: 98 BPM

## 2022-04-15 DIAGNOSIS — R06.02 SOB (SHORTNESS OF BREATH): Primary | ICD-10-CM

## 2022-04-15 DIAGNOSIS — R05.9 COUGH: ICD-10-CM

## 2022-04-15 PROCEDURE — 99284 EMERGENCY DEPT VISIT MOD MDM: CPT

## 2022-04-15 PROCEDURE — 93005 ELECTROCARDIOGRAM TRACING: CPT

## 2022-04-15 PROCEDURE — 71046 X-RAY EXAM CHEST 2 VIEWS: CPT

## 2022-04-15 RX ORDER — GUAIFENESIN 1200 MG/1
1200 TABLET, EXTENDED RELEASE ORAL 2 TIMES DAILY
Qty: 30 TABLET | Refills: 0 | Status: SHIPPED | OUTPATIENT
Start: 2022-04-15

## 2022-04-15 RX ORDER — ALBUTEROL SULFATE 90 UG/1
1 AEROSOL, METERED RESPIRATORY (INHALATION)
Qty: 18 G | Refills: 0 | Status: SHIPPED | OUTPATIENT
Start: 2022-04-15

## 2022-04-15 RX ORDER — BENZONATATE 100 MG/1
100 CAPSULE ORAL
Qty: 30 CAPSULE | Refills: 0 | Status: SHIPPED | OUTPATIENT
Start: 2022-04-15 | End: 2022-04-22

## 2022-04-15 NOTE — ED PROVIDER NOTES
EMERGENCY DEPARTMENT HISTORY AND PHYSICAL EXAM      Date: 4/15/2022  Patient Name: Gena Nettles    History of Presenting Illness     Chief Complaint   Patient presents with    Cough     feels like getting worse from her Covid from three weeks ago; chest hurts hard to breath and coughing up phlegm       History Provided By: Patient    HPI: Gena Nettles, 30 y.o. female with a past medical history of asthma, seasonal allergies who presents emergency department with cough. Patient had a diagnosed COVID infection 3 weeks ago. She reported having significant malaise, fatigue and fevers at that time, but did not require hospitalization. The symptoms did improve but her cough has persisted. She is having frequent coughing spells which is causing shortness of breath and post cough emesis. She ran out of her albuterol inhaler several years ago and has not been using it since. She denies any wheezing or concern for asthma exacerbation. she is coughing up some green sputum and concerned she may have an infection. She is not having any fevers, headache, congestion, sore throat, ear pain, spontaneous vomiting, nausea, abdominal pain, diarrhea, rash, wheezing. There are no other complaints, changes, or physical findings at this time. PCP: None    No current facility-administered medications on file prior to encounter. No current outpatient medications on file prior to encounter. Past History     Past Medical History:  Past Medical History:   Diagnosis Date    Asthma     Other ill-defined conditions(799.89)     eczema       Past Surgical History:  Past Surgical History:   Procedure Laterality Date    [place] ABDOMEN SURGERY PROC UNLISTED      hernia       Family History:  No family history on file.     Social History:  Social History     Tobacco Use    Smoking status: Never Smoker    Smokeless tobacco: Never Used   Substance Use Topics    Alcohol use: No    Drug use: No       Allergies:  No Known Allergies      Review of Systems   Review of Systems   Constitutional: Negative for chills and fever. HENT: Negative for congestion and sore throat. Respiratory: Positive for cough and shortness of breath. Cardiovascular: Negative for chest pain. Gastrointestinal: Positive for vomiting. Negative for abdominal pain, diarrhea and nausea. Genitourinary: Negative for dysuria and hematuria. Musculoskeletal: Negative for myalgias. Skin: Negative for rash. Neurological: Negative for headaches. All other systems reviewed and are negative. Physical Exam   Physical Exam  Vitals and nursing note reviewed. Constitutional:       General: She is not in acute distress. Appearance: Normal appearance. She is obese. She is not ill-appearing, toxic-appearing or diaphoretic. Comments: Patient well-appearing, nontoxic. Resting comfortably in bed with no acute distress and speaking in full sentences   HENT:      Head: Atraumatic. Right Ear: External ear normal.      Left Ear: External ear normal.      Nose: Nose normal. No congestion or rhinorrhea. Mouth/Throat:      Mouth: Mucous membranes are moist.      Pharynx: Oropharynx is clear. No oropharyngeal exudate or posterior oropharyngeal erythema. Eyes:      Extraocular Movements: Extraocular movements intact. Conjunctiva/sclera: Conjunctivae normal.   Cardiovascular:      Rate and Rhythm: Normal rate and regular rhythm. Pulses: Normal pulses. Heart sounds: Normal heart sounds. No murmur heard. No friction rub. No gallop. Pulmonary:      Effort: Pulmonary effort is normal. No respiratory distress. Breath sounds: Normal breath sounds. No stridor. No wheezing, rhonchi or rales. Comments: Lungs clear to auscultation with air movement to bases bilaterally. No adventitious breath sounds. Abdominal:      General: Abdomen is flat. There is no distension. Palpations: Abdomen is soft. Tenderness:  There is no abdominal tenderness. There is no guarding or rebound. Comments: Abdomen soft, nontender with no guarding, rebound tenderness or rigidity   Musculoskeletal:         General: No swelling. Cervical back: Neck supple. Skin:     General: Skin is warm. Neurological:      Mental Status: She is alert and oriented to person, place, and time. Psychiatric:         Mood and Affect: Mood normal.         Behavior: Behavior normal.         Thought Content: Thought content normal.         Judgment: Judgment normal.         Diagnostic Study Results     Labs -   No results found for this or any previous visit (from the past 12 hour(s)). Radiologic Studies -   XR CHEST PA LAT    (Results Pending)     CT Results  (Last 48 hours)    None        CXR Results  (Last 48 hours)    None            Medical Decision Making   I am the first provider for this patient. I reviewed the vital signs, available nursing notes, past medical history, past surgical history, family history and social history. Vital Signs-Reviewed the patient's vital signs. Patient Vitals for the past 12 hrs:   Temp Pulse Resp BP SpO2   04/15/22 0826 98 °F (36.7 °C) 98 16 133/78 100 %       Records Reviewed: Nursing Notes    Provider Notes (Medical Decision Making):   Patient symptoms consistent with a post viral cough. She is not having any  chest pain or shortness of breath concerning for an emergent cardiopulmonary process . patient was concern for pneumonia though aside from her cough not have any concerning signs or symptoms of a infectious process and chest x-ray was unremarkable. She was afebrile with normal vital signs in the ED. She was counseled on symptomatic care and follow-up with her PCP. She was advised on return precautions to the emergency department. Patient expressed understanding and agreement with the discharge instructions and treatment plan. ED Course:   Initial assessment performed.  The patients presenting problems have been discussed, and they are in agreement with the care plan formulated and outlined with them. I have encouraged them to ask questions as they arise throughout their visit. ED Course as of 04/15/22 0913   Fri Apr 15, 2022   0909 Sinus rate and rhythm 95 bpm.  No axis deviation. [place] interval normal 134 ms. Narrow QRS 70 ms. QTc 462 ms. No ischemic ST/T wave changes. [AJ]      ED Course User Index  [AJ] BRISEYDA Colindres       Critical Care Time: None    Disposition:  discharged    PLAN:  1. There are no discharge medications for this patient. 2.   Follow-up Information    None       Return to ED if worse     Diagnosis     Clinical Impression: No diagnosis found. Please note that this dictation was completed with [employer], the computer voice recognition software. Quite often unanticipated grammatical, syntax, homophones, and other interpretive errors are inadvertently transcribed by the computer software. Please disregards these errors. Please excuse any errors that have escaped final proofreading.

## 2022-04-15 NOTE — DISCHARGE INSTRUCTIONS
Follow-up with your primary care doctor in 5 days days if your symptoms are not improving by then. Return to the emergency department sooner if you have any new or worsening symptoms.

## 2022-04-15 NOTE — Clinical Note
[address]  [facility] EMERGENCY DEPT  [address] [facility]  [place] [id number] [phone]    Work/School Note    Date: 4/15/2022    To Whom It May concern:      Unique Richards was seen and treated today in the emergency room by the following provider(s):  Attending Provider: Margo Portillo DO  Physician Assistant: Keely Vazquez Alejo Radames is excused from work/school on 04/15/22. She is clear to return to work/school on 04/16/22.         Sincerely,          BRISEYDA Diego

## 2022-04-16 LAB
ATRIAL RATE: 95 BPM
CALCULATED P AXIS, ECG09: 53 DEGREES
CALCULATED R AXIS, ECG10: 9 DEGREES
CALCULATED T AXIS, ECG11: 23 DEGREES
DIAGNOSIS, 93000: NORMAL
P-R INTERVAL, ECG05: 134 MS
Q-T INTERVAL, ECG07: 368 MS
QRS DURATION, ECG06: 70 MS
QTC CALCULATION (BEZET), ECG08: 462 MS
VENTRICULAR RATE, ECG03: 95 BPM

## 2022-08-03 ENCOUNTER — APPOINTMENT (OUTPATIENT)
Dept: VASCULAR SURGERY | Age: 37
End: 2022-08-03
Attending: PHYSICIAN ASSISTANT
Payer: COMMERCIAL

## 2022-08-03 ENCOUNTER — HOSPITAL ENCOUNTER (EMERGENCY)
Age: 37
Discharge: HOME | End: 2022-08-03
Attending: EMERGENCY MEDICINE
Payer: COMMERCIAL

## 2022-08-03 ENCOUNTER — APPOINTMENT (OUTPATIENT)
Dept: GENERAL RADIOLOGY | Age: 37
End: 2022-08-03
Attending: PHYSICIAN ASSISTANT
Payer: COMMERCIAL

## 2022-08-03 VITALS
HEIGHT: 61 IN | OXYGEN SATURATION: 97 % | DIASTOLIC BLOOD PRESSURE: 82 MMHG | BODY MASS INDEX: 46.26 KG/M2 | TEMPERATURE: 98.2 F | RESPIRATION RATE: 18 BRPM | HEART RATE: 89 BPM | SYSTOLIC BLOOD PRESSURE: 141 MMHG | WEIGHT: 245 LBS

## 2022-08-03 DIAGNOSIS — S93.601A FOOT SPRAIN, RIGHT, INITIAL ENCOUNTER: Primary | ICD-10-CM

## 2022-08-03 DIAGNOSIS — M79.604 PAIN IN POSTERIOR RIGHT LOWER EXTREMITY: ICD-10-CM

## 2022-08-03 LAB — D DIMER PPP FEU-MCNC: 0.71 MG/L FEU (ref 0–0.65)

## 2022-08-03 PROCEDURE — 93971 EXTREMITY STUDY: CPT

## 2022-08-03 PROCEDURE — 99284 EMERGENCY DEPT VISIT MOD MDM: CPT

## 2022-08-03 PROCEDURE — 93971 EXTREMITY STUDY: CPT | Performed by: INTERNAL MEDICINE

## 2022-08-03 PROCEDURE — 85379 FIBRIN DEGRADATION QUANT: CPT

## 2022-08-03 PROCEDURE — 73630 X-RAY EXAM OF FOOT: CPT

## 2022-08-03 PROCEDURE — 36415 COLL VENOUS BLD VENIPUNCTURE: CPT

## 2022-08-03 RX ORDER — NAPROXEN 500 MG/1
500 TABLET ORAL
Qty: 20 TABLET | Refills: 0 | Status: SHIPPED | OUTPATIENT
Start: 2022-08-03 | End: 2022-08-13

## 2022-08-03 NOTE — ED PROVIDER NOTES
EMERGENCY DEPARTMENT HISTORY AND PHYSICAL EXAM    Date: 8/3/2022  Patient Name: Zak Keane    History of Presenting Illness     Chief Complaint   Patient presents with    Foot Pain         History Provided By: Patient      HPI: Zak Keane is a 30 y.o. female with a PMH of asthma and eczema  who presents with right foot pain x2 weeks. Patient states while at work she felt like she had a \"misstep\" while walking and twisted her foot. She states she has been trying to manage it at home and taking Tylenol arthritis. She states it had been helping but symptoms still seem persistent. She states she is also now getting pain of the back of her leg and into the right groin. She denies any chest pain, shortness of breath or swelling of the RLE. Pain is worsened with activity. She states she did make it through about 6 hours at work before her foot will start bothering her. PCP: None    Current Outpatient Medications   Medication Sig Dispense Refill    naproxen (Naprosyn) 500 mg tablet Take 1 Tablet by mouth every twelve (12) hours as needed for Pain for up to 10 days. 20 Tablet 0    albuterol (PROVENTIL HFA, VENTOLIN HFA, PROAIR HFA) 90 mcg/actuation inhaler Take 1 Puff by inhalation every six (6) hours as needed for Wheezing. 18 g 0    guaiFENesin (Mucinex) 1,200 mg Ta12 ER tablet Take 1 Tablet by mouth two (2) times a day. 30 Tablet 0       Past History     Past Medical History:  Past Medical History:   Diagnosis Date    Asthma     Other ill-defined conditions(939.89)     eczema       Past Surgical History:  Past Surgical History:   Procedure Laterality Date    [place] ABDOMEN SURGERY PROC UNLISTED      hernia       Family History:  No family history on file.     Social History:  Social History     Tobacco Use    Smoking status: Never    Smokeless tobacco: Never   Substance Use Topics    Alcohol use: No    Drug use: No       Allergies:  No Known Allergies      Review of Systems   Review of Systems Constitutional:  Negative for chills and fever. Respiratory:  Negative for shortness of breath. Cardiovascular:  Negative for chest pain. Musculoskeletal:  Positive for arthralgias and myalgias. Negative for joint swelling. Allergic/Immunologic: Negative for immunocompromised state. Neurological:  Negative for speech difficulty and weakness. All other systems reviewed and are negative. Physical Exam     Vitals:    08/03/22 1017 08/03/22 1050   BP: (!) 141/82    Pulse: 89    Resp: 18    Temp: 98.2 °F (36.8 °C)    SpO2: 97% 97%   Weight: 111.1 kg (245 lb)    Height: 5' 1\" (1.549 m)      Physical Exam  Vitals and nursing note reviewed. Constitutional:       General: She is not in acute distress. Appearance: She is well-developed. HENT:      Head: Normocephalic and atraumatic. Eyes:      Conjunctiva/sclera: Conjunctivae normal.   Cardiovascular:      Rate and Rhythm: Normal rate and regular rhythm. Heart sounds: Normal heart sounds. Pulmonary:      Effort: Pulmonary effort is normal. No respiratory distress. Breath sounds: Normal breath sounds. No wheezing or rales. Musculoskeletal:      Right foot: Normal capillary refill. Tenderness (Tenderness DM distal first and second metatarsal) present. No swelling or deformity. Normal pulse. Skin:     General: Skin is warm and dry. Neurological:      Mental Status: She is alert and oriented to person, place, and time. Psychiatric:         Behavior: Behavior normal.         Thought Content: Thought content normal.         Judgment: Judgment normal.         Diagnostic Study Results     Labs -     Recent Results (from the past 12 hour(s))   D DIMER    Collection Time: 08/03/22 10:40 AM   Result Value Ref Range    D-dimer 0.71 (H) 0.00 - 0.65 mg/L FEU       Radiologic Studies -   XR FOOT RT MIN 3 V   Final Result   No acute abnormality.       DUPLEX LOWER EXT VENOUS RIGHT    (Results Pending)     CT Results  (Last 48 hours)      None CXR Results  (Last 48 hours)      None              Medical Decision Making   I am the first provider for this patient. I reviewed the vital signs, available nursing notes, past medical history, past surgical history, family history and social history. Vital Signs-Reviewed the patient's vital signs. Records Reviewed: Nursing Notes and Old Medical Records    Provider Notes (Medical Decision Making):   Patient presents with right foot pain x2 weeks after she reports having a \"misstep\" while at work. DDx: Strain, sprain, fracture. Patient does report some pain of the posterior calf and into the right groin,  low suspicion for DVT however Well score is 1 so will start with d dimer if elevated will get U/S    ED Course as of 08/03/22 1317   Wed Aug 03, 2022   1117 Discussed elevated d dimer with pt and need for vascular U/S now []      ED Course User Index  [] Megha Mays PA-C          Disposition:  Discharged    DISCHARGE NOTE:   [address] outlined and precautions discussed. Patient has no new complaints, changes, or physical findings. Results of imaging were reviewed with the patient. All medications were reviewed with the patient; will d/c home. All of pt's questions and concerns were addressed. Patient was instructed and agrees to follow up with PCP, as well as to return to the ED upon further deterioration. Patient is ready to go home. Follow-up Information       Follow up With Specialties Details Why Contact Info    Jeromy Schuster DPM Podiatry Schedule an appointment as soon as possible for a visit in 1 week As needed [address] [place] [address]  [phone]              Discharge Medication List as of 8/3/2022 11:47 AM        START taking these medications    Details   naproxen (Naprosyn) 500 mg tablet Take 1 Tablet by mouth every twelve (12) hours as needed for Pain for up to 10 days. , Normal, Disp-20 Tablet, R-0           CONTINUE these medications which have NOT CHANGED    Details   albuterol (PROVENTIL HFA, VENTOLIN HFA, PROAIR HFA) 90 mcg/actuation inhaler Take 1 Puff by inhalation every six (6) hours as needed for Wheezing., Normal, Disp-18 g, R-0      guaiFENesin (Mucinex) 1,200 mg Ta12 ER tablet Take 1 Tablet by mouth two (2) times a day., Normal, Disp-30 Tablet, R-0             Procedures:  Procedures    Please note that this dictation was completed with Dragon, computer voice recognition software. Quite often unanticipated grammatical, syntax, homophones, and other interpretive errors are inadvertently transcribed by the computer software. Please disregard these errors. Additionally, please excuse any errors that have escaped final proofreading. Diagnosis     Clinical Impression:   1. Foot sprain, right, initial encounter    2.  Pain in posterior right lower extremity

## 2022-08-03 NOTE — Clinical Note
[address] EMERGENCY DEPT  [address] [facility] [id number] [phone]    Work/School Note    Date: 8/3/2022    To Whom It May concern:    Lamonte Honeycutt was seen and treated today in the emergency room by the following provider(s):  Attending Provider: Shon Brewer MD  Physician Assistant: Suly Flores PA-C. Lamonte Honeycutt is excused from work/school on 08/03/22 and 08/04/22. She is medically clear to return to work/school on 8/5/2022.        Sincerely,          Courtney Loyd PA-C

## 2022-08-03 NOTE — ED TRIAGE NOTES
Patient states she injured her right foot at work 2 weeks ago. She complains of continuing pain that is worsened with activity.

## 2023-04-19 ENCOUNTER — HOSPITAL ENCOUNTER (EMERGENCY)
Age: 38
Discharge: HOME | End: 2023-04-19
Attending: EMERGENCY MEDICINE
Payer: COMMERCIAL

## 2023-04-19 VITALS
HEIGHT: 61 IN | HEART RATE: 96 BPM | BODY MASS INDEX: 43.43 KG/M2 | TEMPERATURE: 97.9 F | OXYGEN SATURATION: 98 % | SYSTOLIC BLOOD PRESSURE: 111 MMHG | WEIGHT: 230 LBS | DIASTOLIC BLOOD PRESSURE: 75 MMHG | RESPIRATION RATE: 18 BRPM

## 2023-04-19 DIAGNOSIS — M54.31 SCIATICA OF RIGHT SIDE: ICD-10-CM

## 2023-04-19 DIAGNOSIS — S39.012A LUMBAR STRAIN, INITIAL ENCOUNTER: Primary | ICD-10-CM

## 2023-04-19 PROCEDURE — 99283 EMERGENCY DEPT VISIT LOW MDM: CPT

## 2023-04-19 RX ORDER — LIDOCAINE 50 MG/G
PATCH TOPICAL
Qty: 20 EACH | Refills: 0 | Status: SHIPPED | OUTPATIENT
Start: 2023-04-19

## 2023-04-19 RX ORDER — IBUPROFEN 600 MG/1
600 TABLET ORAL
Qty: 30 TABLET | Refills: 0 | Status: SHIPPED | OUTPATIENT
Start: 2023-04-19 | End: 2023-04-29

## 2023-04-19 RX ORDER — METHOCARBAMOL 750 MG/1
750 TABLET, FILM COATED ORAL 4 TIMES DAILY
Qty: 20 TABLET | Refills: 0 | Status: SHIPPED | OUTPATIENT
Start: 2023-04-19 | End: 2023-04-24

## 2023-04-19 RX ORDER — ACETAMINOPHEN 325 MG/1
650 TABLET ORAL EVERY 6 HOURS
Qty: 40 TABLET | Refills: 0 | Status: SHIPPED | OUTPATIENT
Start: 2023-04-19 | End: 2023-04-24

## 2023-04-19 RX ORDER — DICLOFENAC SODIUM 10 MG/G
2 GEL TOPICAL
Qty: 50 G | Refills: 0 | Status: SHIPPED | OUTPATIENT
Start: 2023-04-19 | End: 2023-04-29

## 2023-04-19 NOTE — ED PROVIDER NOTES
[facility] EMERGENCY DEPT  EMERGENCY DEPARTMENT ENCOUNTER       Pt Name: Christopher Monte  MRN: [id number]  [place] 1985  Date of evaluation: 4/19/2023  Provider: BRISEYDA Zavala   PCP: None  Note Started: 4:55 PM 4/19/23     ED attending involment: I have seen and evaluated the patient. My supervision physician was available for consultation. CHIEF COMPLAINT       Chief Complaint   Patient presents with    Back Pain        HISTORY OF PRESENT ILLNESS: 1 or more elements      History From: Patient  HPI Limitations : None     [facility] is a 41 y.o. female who presents to the ED with right lower back pain. This began a few days ago. She endorses a sharp pain over her right lower back/superior buttocks that radiates down the outside of her right leg. She denies any known inciting injury or trauma, but does work lifting patients frequently. She reports identical pain 2 to 3 months ago that she attributed to a back strain/sciatica that was also caused by lifting patients. She denies any other associated symptoms to include fevers, headaches, dizziness, chest pain, shortness of breath, nausea, vomiting abdominal pain, diarrhea, constipation, bloody stools, dysuria, hematuria, abnormal vaginal discharge or bleeding, rash. Nursing Notes were all reviewed and agreed with or any disagreements were addressed in the HPI. REVIEW OF SYSTEMS      Review of Systems     Positives and Pertinent negatives as per HPI. PAST HISTORY     Past Medical History:  Past Medical History:   Diagnosis Date    Asthma     Other ill-defined conditions(799.89)     eczema       Past Surgical History:  Past Surgical History:   Procedure Laterality Date    [place] ABDOMEN SURGERY PROC UNLISTED      hernia       Family History:  No family history on file.     Social History:  Social History     Tobacco Use    Smoking status: Never    Smokeless tobacco: Never   Substance Use Topics    Alcohol use: No    Drug use: No       Allergies:  No Known Allergies    CURRENT MEDICATIONS      Discharge Medication List as of 4/19/2023  4:46 PM        CONTINUE these medications which have NOT CHANGED    Details   albuterol (PROVENTIL HFA, VENTOLIN HFA, PROAIR HFA) 90 mcg/actuation inhaler Take 1 Puff by inhalation every six (6) hours as needed for Wheezing., Normal, Disp-18 g, R-0      guaiFENesin (Mucinex) 1,200 mg Ta12 ER tablet Take 1 Tablet by mouth two (2) times a day., Normal, Disp-30 Tablet, R-0             PHYSICAL EXAM      ED Triage Vitals   ED Encounter Vitals Group      BP 04/19/23 1502 111/75      Pulse (Heart Rate) 04/19/23 1502 96      Resp Rate 04/19/23 1502 18      Temp 04/19/23 1502 97.9 °F (36.6 °C)      Temp src --       O2 Sat (%) 04/19/23 1502 98 %      Weight 04/19/23 1501 230 lb      Height 04/19/23 1501 5' 1\"        Physical Exam  Vitals and nursing note reviewed. Constitutional:       Appearance: Normal appearance. HENT:      Head: Normocephalic and atraumatic. Right Ear: External ear normal.      Left Ear: External ear normal.      Nose: Nose normal.      Mouth/Throat:      Pharynx: Oropharynx is clear. Eyes:      Conjunctiva/sclera: Conjunctivae normal.   Cardiovascular:      Rate and Rhythm: Normal rate and regular rhythm. Pulses: Normal pulses. Pulmonary:      Effort: Pulmonary effort is normal.      Breath sounds: Normal breath sounds. Abdominal:      General: Abdomen is flat. Palpations: Abdomen is soft. Musculoskeletal:         General: Tenderness present. No swelling. Cervical back: Normal range of motion and neck supple. Comments: Tenderness over distribution of right piriformis. Hypertonicity of right lumbar paraspinous muscles. Skin:     General: Skin is warm. Capillary Refill: Capillary refill takes less than 2 seconds. Findings: No erythema or rash. Neurological:      General: No focal deficit present. Mental Status: She is alert.       Comments: 5/5 strength lower extremities bilaterally. Sensation intact diffusely throughout lower extremities. She is ambulatory with a steady gait. Psychiatric:         Mood and Affect: Mood normal.         Behavior: Behavior normal.         Thought Content: Thought content normal.         Judgment: Judgment normal.        DIAGNOSTIC RESULTS   LABS:     No results found for this or any previous visit (from the past 12 hour(s)). RADIOLOGY:  Non-plain film images such as CT, Ultrasound and MRI are read by the radiologist. Plain radiographic images are visualized and preliminarily interpreted by myself, BRISEYDA Espinal, ED provider   with the below findings:          Interpretation per the Radiologist below, if available at the time of this note:     No results found. PROCEDURES   Unless otherwise noted below, none  Procedures     EMERGENCY DEPARTMENT COURSE and DIFFERENTIAL DIAGNOSIS/MDM   Vitals:    Vitals:    04/19/23 1501 04/19/23 1502   BP:  111/75   Pulse:  96   Resp:  18   Temp:  97.9 °F (36.6 °C)   SpO2:  98%   Weight: 104.3 kg (230 lb)    Height: 5' 1\" (1.549 m)         Patient was given the following medications:  Medications - No data to display    CONSULTS: (Who and What was discussed)  None    Chronic Conditions: Obesity    Social Determinants affecting Dx or Tx: None    Records Reviewed (source and summary): Nursing notes    MDM (CC/HPI Summary, DDx, ED Course, Reassessment, Disposition Considerations -Tests not done, Shared Decision Making, Pt Expectation of Test or Tx.): 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 She does not have any associated symptoms concerning for pyelonephritis, UTI, pyelonephritis, hepatobiliary disease other intra-abdominal process.   Patient was offered further testing for urinalysis however declined given her previous history of identical pain that was attributed to lumbar strain/sciatica as well as her easily provokable musculoskeletal tenderness on exam today. She had no features concerning for limb ischemia or cord compression. She will be treated symptomatically for radiculopathy and soft tissue strain and will follow-up with orthopedics for further care and evaluation. She was given return precautions ED. Patient expressed understanding agreement the discharge instructions and treatment plan             FINAL IMPRESSION     1. Lumbar strain, initial encounter    2. Sciatica of right side          DISPOSITION/PLAN   Discharged        Care plan outlined and precautions discussed. Patient has no new complaints, changes, or physical findings. Results of evaluation were reviewed with the patient. All medications were reviewed with the patient; will d/c home with lidocaine patches, Voltaren gel, Robaxin, Tylenol, Motrin. All of pt's questions and concerns were addressed. Patient was instructed and agrees to follow up with orthopedics, as well as to return to the ED upon further deterioration. Patient is ready to go home. PATIENT REFERRED TO:  Follow-up Information       Follow up With Specialties Details Why Anupam Del Toro  Schedule an appointment as soon as possible for a visit   [facility]  [address] [place] [address] [place]  [phone]    [facility] EMERGENCY DEPT Emergency Medicine Go to  If symptoms worsen [address] [place] [address]  [phone]              DISCHARGE MEDICATIONS:  Discharge Medication List as of 4/19/2023  4:46 PM        START taking these medications    Details   ibuprofen (MOTRIN) 600 mg tablet Take 1 Tablet by mouth three (3) times daily (with meals) for 10 days. , Normal, Disp-30 Tablet, R-0      acetaminophen (TYLENOL) 325 mg tablet Take 2 Tablets by mouth every six (6) hours for 5 days. , Normal, Disp-40 Tablet, R-0      diclofenac (Voltaren) 1 % gel Apply 2 g to affected area two (2) times daily as needed for Pain for up to 10 days. , Normal, Disp-50 g, R-0      lidocaine (LIDODERM) 5 % Apply patch to the affected area for 12 hours a day and remove for 12 hours a day., Normal, Disp-20 Each, R-0      methocarbamoL (ROBAXIN) 750 mg tablet Take 1 Tablet by mouth four (4) times daily for 5 days. , Normal, Disp-20 Tablet, R-0           CONTINUE these medications which have NOT CHANGED    Details   albuterol (PROVENTIL HFA, VENTOLIN HFA, PROAIR HFA) 90 mcg/actuation inhaler Take 1 Puff by inhalation every six (6) hours as needed for Wheezing., Normal, Disp-18 g, R-0      guaiFENesin (Mucinex) 1,200 mg Ta12 ER tablet Take 1 Tablet by mouth two (2) times a day., Normal, Disp-30 Tablet, R-0               DISCONTINUED MEDICATIONS:  Discharge Medication List as of 4/19/2023  4:46 PM          I am the Primary Clinician of Record. BRISEYDA Rob (electronically signed)    (Please note that parts of this dictation were completed with voice recognition software. Quite often unanticipated grammatical, syntax, homophones, and other interpretive errors are inadvertently transcribed by the computer software. Please disregards these errors.  Please excuse any errors that have escaped final proofreading.)

## 2023-04-19 NOTE — ED TRIAGE NOTES
Pt arrives to ed w reports of R flank pain, lower back pain radiating to R leg w numbness/tingling onset yesterday. HX similar episode following injury 2 mo ago.

## 2023-10-05 ENCOUNTER — HOSPITAL ENCOUNTER (EMERGENCY)
Facility: HOSPITAL | Age: 38
Discharge: HOME | End: 2023-10-05
Payer: COMMERCIAL

## 2023-10-05 VITALS
DIASTOLIC BLOOD PRESSURE: 90 MMHG | HEIGHT: 61 IN | WEIGHT: 250 LBS | BODY MASS INDEX: 47.2 KG/M2 | TEMPERATURE: 98.2 F | RESPIRATION RATE: 19 BRPM | HEART RATE: 74 BPM | OXYGEN SATURATION: 97 % | SYSTOLIC BLOOD PRESSURE: 122 MMHG

## 2023-10-05 DIAGNOSIS — Z20.822 PERSON UNDER INVESTIGATION FOR COVID-19: ICD-10-CM

## 2023-10-05 DIAGNOSIS — J06.9 VIRAL URI WITH COUGH: Primary | ICD-10-CM

## 2023-10-05 DIAGNOSIS — Z87.09 HISTORY OF ASTHMA: ICD-10-CM

## 2023-10-05 PROCEDURE — 99283 EMERGENCY DEPT VISIT LOW MDM: CPT

## 2023-10-05 PROCEDURE — 87635 SARS-COV-2 COVID-19 AMP PRB: CPT

## 2023-10-05 RX ORDER — IBUPROFEN 800 MG/1
800 TABLET ORAL EVERY 8 HOURS PRN
Qty: 20 TABLET | Refills: 0 | Status: SHIPPED | OUTPATIENT
Start: 2023-10-05

## 2023-10-05 RX ORDER — GUAIFENESIN/DEXTROMETHORPHAN 100-10MG/5
5 SYRUP ORAL 3 TIMES DAILY PRN
Qty: 120 ML | Refills: 0 | Status: SHIPPED | OUTPATIENT
Start: 2023-10-05 | End: 2023-10-15

## 2023-10-05 RX ORDER — PREDNISONE 5 MG/1
TABLET ORAL
Qty: 1 EACH | Refills: 0 | Status: SHIPPED | OUTPATIENT
Start: 2023-10-05

## 2023-10-05 RX ORDER — CETIRIZINE HYDROCHLORIDE 10 MG/1
10 TABLET ORAL DAILY
Qty: 30 TABLET | Refills: 0 | Status: SHIPPED | OUTPATIENT
Start: 2023-10-05 | End: 2023-11-04

## 2023-10-05 RX ORDER — ALBUTEROL SULFATE 90 UG/1
2 AEROSOL, METERED RESPIRATORY (INHALATION) 4 TIMES DAILY PRN
Qty: 18 G | Refills: 1 | Status: SHIPPED | OUTPATIENT
Start: 2023-10-05

## 2023-10-05 ASSESSMENT — ENCOUNTER SYMPTOMS
COUGH: 1
SHORTNESS OF BREATH: 0

## 2023-10-05 ASSESSMENT — VISUAL ACUITY: OU: 1

## 2023-10-05 ASSESSMENT — PAIN - FUNCTIONAL ASSESSMENT: PAIN_FUNCTIONAL_ASSESSMENT: NONE - DENIES PAIN

## 2023-10-05 NOTE — ED TRIAGE NOTES
Reports productive cough, nasal congestion, denies sore thorat or fever. Pt reports had flu shot 2 days ago. Symptoms started today. Resp even and unlabored. LCTA. Speaking full clear sentences.  100% on RA

## 2023-10-06 LAB
SARS-COV-2 RNA RESP QL NAA+PROBE: NOT DETECTED
SOURCE: NORMAL

## 2023-10-06 NOTE — ED PROVIDER NOTES
how and when to take each. * albuterol sulfate  (90 Base) MCG/ACT inhaler  Commonly known as: Ventolin HFA  Inhale 2 puffs into the lungs 4 times daily as needed for Wheezing  What changed: You were already taking a medication with the same name, and this prescription was added. Make sure you understand how and when to take each. * This list has 2 medication(s) that are the same as other medications prescribed for you. Read the directions carefully, and ask your doctor or other care provider to review them with you. ASK your doctor about these medications      Mucinex Maximum Strength 1200 MG Tb12  Generic drug: guaiFENesin               Where to Get Your Medications        These medications were sent to [address] [place] [address] [phone]  [address] [place] [address] 60, [address] [facility]      Phone: [phone]   albuterol sulfate  (90 Base) MCG/ACT inhaler  cetirizine 10 MG tablet  guaiFENesin-dextromethorphan 100-10 MG/5ML syrup  ibuprofen 800 MG tablet  predniSONE 5 MG (21) Tbpk           DISCONTINUED MEDICATIONS:  Discharge Medication List as of 10/5/2023  8:42 PM        I have seen and evaluated the patient autonomously. My supervision physician was on site and available for consultation if needed. I am the Primary Clinician of Record. Severa Cooler, PA (electronically signed)    (Please note that parts of this dictation were completed with voice recognition software. Quite often unanticipated grammatical, syntax, homophones, and other interpretive errors are inadvertently transcribed by the computer software. Please disregards these errors.  Please excuse any errors that have escaped final proofreading.)       Lillian Bois D [place], [place]  10/05/23 2118

## 2023-10-06 NOTE — ED NOTES
Discharge instructions were given to the patient by [facility], RN. The patient left the Emergency Department ambulatory, alert and oriented and in no acute distress with 4 prescriptions. The patient was encouraged to call or return to the ED for worsening issues or problems and was encouraged to schedule a follow up appointment for continuing care. The patient verbalized understanding of discharge instructions and prescriptions, all questions were answered. The patient has no further concerns at this time.         Rox Albert RN  10/05/23 [id number]

## 2024-03-20 ENCOUNTER — HOSPITAL ENCOUNTER (EMERGENCY)
Facility: HOSPITAL | Age: 39
Discharge: HOME | End: 2024-03-20
Attending: STUDENT IN AN ORGANIZED HEALTH CARE EDUCATION/TRAINING PROGRAM
Payer: COMMERCIAL

## 2024-03-20 VITALS
RESPIRATION RATE: 18 BRPM | BODY MASS INDEX: 50.49 KG/M2 | SYSTOLIC BLOOD PRESSURE: 128 MMHG | WEIGHT: 267.2 LBS | TEMPERATURE: 97.9 F | OXYGEN SATURATION: 98 % | HEART RATE: 99 BPM | DIASTOLIC BLOOD PRESSURE: 65 MMHG

## 2024-03-20 DIAGNOSIS — H66.92 ACUTE OTITIS MEDIA, LEFT: Primary | ICD-10-CM

## 2024-03-20 PROCEDURE — 99283 EMERGENCY DEPT VISIT LOW MDM: CPT

## 2024-03-20 RX ORDER — ACETAMINOPHEN 500 MG
1000 TABLET ORAL 3 TIMES DAILY PRN
Qty: 100 TABLET | Refills: 0 | Status: SHIPPED | OUTPATIENT
Start: 2024-03-20

## 2024-03-20 RX ORDER — IBUPROFEN 600 MG/1
600 TABLET ORAL EVERY 6 HOURS PRN
Qty: 50 TABLET | Refills: 1 | Status: SHIPPED | OUTPATIENT
Start: 2024-03-20

## 2024-03-20 RX ORDER — DIPHENHYDRAMINE HCL 25 MG
25 CAPSULE ORAL EVERY 6 HOURS PRN
Qty: 40 CAPSULE | Refills: 0 | Status: SHIPPED | OUTPATIENT
Start: 2024-03-20 | End: 2024-03-30

## 2024-03-20 RX ORDER — AMOXICILLIN AND CLAVULANATE POTASSIUM 875; 125 MG/1; MG/1
1 TABLET, FILM COATED ORAL 2 TIMES DAILY
Qty: 14 TABLET | Refills: 0 | Status: SHIPPED | OUTPATIENT
Start: 2024-03-20 | End: 2024-03-27

## 2024-03-20 ASSESSMENT — PAIN SCALES - GENERAL: PAINLEVEL_OUTOF10: 0

## 2024-03-20 NOTE — ED PROVIDER NOTES
[facility] EMERGENCY DEPT  EMERGENCY DEPARTMENT ENCOUNTER       Pt Name: Jaz Rosas  MRN: [id number]  Birthdate 1985  Date of evaluation: 3/20/2024  Provider: Humberto Butt MD   PCP: No primary care provider on file.  Note Started: 12:11 PM EDT 3/20/24     CHIEF COMPLAINT       Chief Complaint   Patient presents with    Sinusitis     Starting last night with post nasal trip and swelling to lymphnodes, dry cough with runny nose        HISTORY OF PRESENT ILLNESS: 1 or more elements      History From: Patient  HPI Limitations: None     Jaz Rosas is a 39 y.o. female who presents for right ear pain, nasal congestion, dry cough.  No reports of fever, nausea, vomiting, shortness of breath, chest pain, neck pain.  She reports a dull headache.  She reports she feels like her sinuses are full, and her right ear was hurting more than usual.  No medications taken today.  No past medical history.  No rashes.  No known sick contacts.  No abdominal pain or chest pain.     Nursing Notes were all reviewed and agreed with or any disagreements were addressed in the HPI.     REVIEW OF SYSTEMS      Review of Systems     Positives and Pertinent negatives as per HPI.    PAST HISTORY     Past Medical History:  Past Medical History:   Diagnosis Date    Asthma     Other ill-defined conditions(799.89)     eczema         Past Surgical History:  Past Surgical History:   Procedure Laterality Date    [place] UNLISTED PROCEDURE ABDOMEN PERITONEUM & OMENTUM      hernia       Family History:  No family history on file.    Social History:  Social History     Tobacco Use    Smoking status: Never    Smokeless tobacco: Never   Substance Use Topics    Alcohol use: No    Drug use: No       Allergies:  No Known Allergies    CURRENT MEDICATIONS      Discharge Medication List as of 3/20/2024 12:13 PM        CONTINUE these medications which have NOT CHANGED    Details   predniSONE 5 MG (21) TBPK Per Dose Pack instructions, Disp-1 each, R-0Normal     [03/20/24 1117]   Enc Vitals Group      /65      Pulse 99      Respirations 18      Temp 97.9 °F (36.6 °C)      Temp src       SpO2 98 %      Weight - Scale 121.2 kg (267 lb 3.2 oz)      Height       Head Circumference       Peak Flow       Pain Score       Pain Loc       Pain Edu?       Excl. in GC?               Physical Exam  ***     DIAGNOSTIC RESULTS   LABS:     No results found for this or any previous visit (from the past 24 hour(s)).        EKG: ***     RADIOLOGY:  Non-plain film images such as CT, Ultrasound and MRI are read by the radiologist. Plain radiographic images are visualized and preliminarily interpreted by the ED Provider with the below findings:     ***     Interpretation per the Radiologist below, if available at the time of this note:     No orders to display           PROCEDURES   Unless otherwise noted below, none  Procedures       CRITICAL CARE TIME   ***    SCREENINGS   NIH Stroke Score       Heart Score       Curb-65          EMERGENCY DEPARTMENT COURSE and DIFFERENTIAL DIAGNOSIS/MDM   Vitals:    Vitals:    03/20/24 1117   BP: 128/65   Pulse: 99   Resp: 18   Temp: 97.9 °F (36.6 °C)   SpO2: 98%   Weight: 121.2 kg (267 lb 3.2 oz)            Patient was given the following medications:  Medications - No data to display    CONSULTS: (Who and What was discussed)  None      Chronic Conditions: ***    Social Determinants affecting Dx or Tx: {Social Barriers (Optional):83516}    Records Reviewed (source and summary of external notes): {CDIRECORDSREVIEWED:3005275395}    CC/HPI Summary, DDx, ED Course, and Reassessment: ***         Disposition Considerations (Tests not done, Shared Decision Making, Pt Expectation of Test or Tx.): ***     FINAL IMPRESSION     1. Acute otitis media, left          DISPOSITION/PLAN   DISPOSITION Decision To Discharge 03/20/2024 12:11:48 PM      {Disposition (Optional):54536}     PATIENT REFERRED TO:  [facility] LAURA  [facility] [place]

## 2024-08-06 ENCOUNTER — APPOINTMENT (OUTPATIENT)
Facility: HOSPITAL | Age: 39
End: 2024-08-06
Payer: COMMERCIAL

## 2024-08-06 ENCOUNTER — HOSPITAL ENCOUNTER (EMERGENCY)
Facility: HOSPITAL | Age: 39
Discharge: HOME | End: 2024-08-06
Payer: COMMERCIAL

## 2024-08-06 VITALS
TEMPERATURE: 98.8 F | BODY MASS INDEX: 50.36 KG/M2 | DIASTOLIC BLOOD PRESSURE: 77 MMHG | OXYGEN SATURATION: 97 % | HEIGHT: 61 IN | WEIGHT: 266.76 LBS | HEART RATE: 90 BPM | RESPIRATION RATE: 17 BRPM | SYSTOLIC BLOOD PRESSURE: 129 MMHG

## 2024-08-06 DIAGNOSIS — U07.1 COVID-19: Primary | ICD-10-CM

## 2024-08-06 LAB
FLUAV RNA SPEC QL NAA+PROBE: NOT DETECTED
FLUBV RNA SPEC QL NAA+PROBE: NOT DETECTED
SARS-COV-2 RNA RESP QL NAA+PROBE: DETECTED

## 2024-08-06 PROCEDURE — 6370000000 HC RX 637 (ALT 250 FOR IP): Performed by: PHYSICIAN ASSISTANT

## 2024-08-06 PROCEDURE — 71046 X-RAY EXAM CHEST 2 VIEWS: CPT

## 2024-08-06 PROCEDURE — 87636 SARSCOV2 & INF A&B AMP PRB: CPT

## 2024-08-06 PROCEDURE — 99284 EMERGENCY DEPT VISIT MOD MDM: CPT

## 2024-08-06 RX ORDER — ALBUTEROL SULFATE 90 UG/1
2 AEROSOL, METERED RESPIRATORY (INHALATION) 4 TIMES DAILY PRN
Qty: 18 G | Refills: 0 | Status: SHIPPED | OUTPATIENT
Start: 2024-08-06

## 2024-08-06 RX ORDER — IBUPROFEN 600 MG/1
600 TABLET ORAL
Status: COMPLETED | OUTPATIENT
Start: 2024-08-06 | End: 2024-08-06

## 2024-08-06 RX ORDER — ACETAMINOPHEN 500 MG
1000 TABLET ORAL
Status: COMPLETED | OUTPATIENT
Start: 2024-08-06 | End: 2024-08-06

## 2024-08-06 RX ADMIN — IBUPROFEN 600 MG: 600 TABLET, FILM COATED ORAL at 11:21

## 2024-08-06 RX ADMIN — ACETAMINOPHEN 1000 MG: 500 TABLET ORAL at 11:21

## 2024-08-06 ASSESSMENT — PAIN SCALES - GENERAL: PAINLEVEL_OUTOF10: 6

## 2024-08-06 ASSESSMENT — PAIN DESCRIPTION - LOCATION: LOCATION: GENERALIZED

## 2024-08-06 ASSESSMENT — PAIN - FUNCTIONAL ASSESSMENT: PAIN_FUNCTIONAL_ASSESSMENT: 0-10

## 2024-08-06 ASSESSMENT — PAIN DESCRIPTION - DESCRIPTORS: DESCRIPTORS: ACHING

## 2024-08-06 ASSESSMENT — PAIN DESCRIPTION - PAIN TYPE: TYPE: ACUTE PAIN

## 2024-08-06 NOTE — DISCHARGE INSTRUCTIONS
Thank You!    It was a pleasure taking care of you in our Emergency Department today. We know that when you come to [facility], you are entrusting us with your health, comfort, and safety. Our clinicians honor that trust, and truly appreciate the opportunity to care for you and your loved ones.    If you receive a survey about your Emergency Department experience today, please fill it out.  We value your feedback. Thank you.      Lynda Bergman PA-C    ___________________________________  I have included a copy of your lab results and/or radiologic studies from today's visit so you can have them easily available at your follow-up visit.   Recent Results (from the past 12 hour(s))   COVID-19 & Influenza Combo    Collection Time: 08/06/24 11:06 AM    Specimen: Nasopharyngeal   Result Value Ref Range    SARS-CoV-2, PCR Detected (A) NOTD      Rapid Influenza A By PCR Not detected NOTD      Rapid Influenza B By PCR Not detected NOTD         XR CHEST (2 VW)   Final Result   No acute cardiopulmonary disease.         Electronically signed by Andrew Barlow MD        [unfilled]

## 2024-08-06 NOTE — ED PROVIDER NOTES
[facility] EMERGENCY DEPT  EMERGENCY DEPARTMENT ENCOUNTER       Pt Name: Jaz Rosas  MRN: [id number]  Birthdate 1985  Date of evaluation: 8/6/2024  Provider: TRACEY Vazquez   PCP: No primary care provider on file.  Note Started: 10:55 AM EDT 8/6/24     CHIEF COMPLAINT       Chief Complaint   Patient presents with    Cough     Pt had a positive COVID exposure Friday. Pt states she is having body aches, chills, nasal congestion, and cough with production    Generalized Body Aches        HISTORY OF PRESENT ILLNESS: 1 or more elements      History From: Patient  None     Jaz Rosas is a 39 y.o. female with PMHx asthma and eczema, with additional history as noted below, who presents to the ED for evaluation of intermittent cough, sinus congestion, body aches and malaise. States she had a known covid19 exposure.  Has had some intermittent chills, denies measured fevers.  Cough is emergently productive.  Denies chest pain, shortness of breath, or wheezing.  Tolerating p.o. well, no changes in bowel or bladder habits.  Over-the-counter cold medication yesterday.     Nursing Notes were all reviewed and agreed with or any disagreements were addressed in the HPI.     REVIEW OF SYSTEMS      Review of Systems   All other systems reviewed and are negative.       Positives and Pertinent negatives as per HPI.    PAST HISTORY     Past Medical History:  Past Medical History:   Diagnosis Date    Asthma     Other ill-defined conditions(799.89)     eczema       Past Surgical History:  Past Surgical History:   Procedure Laterality Date    [place] UNLISTED PROCEDURE ABDOMEN PERITONEUM & OMENTUM      hernia       Family History:  No family history on file.    Social History:  Social History     Tobacco Use    Smoking status: Never    Smokeless tobacco: Never   Substance Use Topics    Alcohol use: No    Drug use: No       Allergies:  No Known Allergies        PHYSICAL EXAM      ED Triage Vitals [08/06/24 1051]   [facility] Vitals Group

## 2024-12-26 ENCOUNTER — APPOINTMENT (OUTPATIENT)
Facility: HOSPITAL | Age: 39
End: 2024-12-26
Payer: COMMERCIAL

## 2024-12-26 ENCOUNTER — HOSPITAL ENCOUNTER (EMERGENCY)
Facility: HOSPITAL | Age: 39
Discharge: HOME | End: 2024-12-26
Payer: COMMERCIAL

## 2024-12-26 VITALS
SYSTOLIC BLOOD PRESSURE: 133 MMHG | OXYGEN SATURATION: 97 % | WEIGHT: 280.2 LBS | BODY MASS INDEX: 52.94 KG/M2 | HEART RATE: 93 BPM | RESPIRATION RATE: 20 BRPM | TEMPERATURE: 97.9 F | DIASTOLIC BLOOD PRESSURE: 75 MMHG

## 2024-12-26 DIAGNOSIS — J45.909 MILD REACTIVE AIRWAYS DISEASE, UNSPECIFIED WHETHER PERSISTENT: ICD-10-CM

## 2024-12-26 DIAGNOSIS — J06.9 ACUTE UPPER RESPIRATORY INFECTION: Primary | ICD-10-CM

## 2024-12-26 LAB
FLUAV RNA SPEC QL NAA+PROBE: NOT DETECTED
FLUBV RNA SPEC QL NAA+PROBE: NOT DETECTED
SARS-COV-2 RNA RESP QL NAA+PROBE: NOT DETECTED
SOURCE: NORMAL

## 2024-12-26 PROCEDURE — 99285 EMERGENCY DEPT VISIT HI MDM: CPT

## 2024-12-26 PROCEDURE — 71045 X-RAY EXAM CHEST 1 VIEW: CPT

## 2024-12-26 PROCEDURE — 87636 SARSCOV2 & INF A&B AMP PRB: CPT

## 2024-12-26 PROCEDURE — 93005 ELECTROCARDIOGRAM TRACING: CPT | Performed by: EMERGENCY MEDICINE

## 2024-12-26 RX ORDER — ALBUTEROL SULFATE 90 UG/1
2 INHALANT RESPIRATORY (INHALATION) 4 TIMES DAILY PRN
Qty: 18 G | Refills: 0 | Status: SHIPPED | OUTPATIENT
Start: 2024-12-26

## 2024-12-26 RX ORDER — PREDNISONE 10 MG/1
TABLET ORAL
Qty: 21 TABLET | Refills: 0 | Status: SHIPPED | OUTPATIENT
Start: 2024-12-26 | End: 2024-12-31

## 2024-12-26 ASSESSMENT — PAIN SCALES - GENERAL: PAINLEVEL_OUTOF10: 6

## 2024-12-26 NOTE — DISCHARGE INSTRUCTIONS
Thank You!    It was a pleasure taking care of you in our Emergency Department today. We know that when you come to our Emergency Department, you are entrusting us with your health, comfort, and safety. Our physicians and nurses honor that trust, and truly appreciate the opportunity to care for you and your loved ones.      We also value your feedback. If you receive a survey about your Emergency Department experience today, please fill it out.  We care about our patients' feedback, and we listen to what you have to say.  Thank you.    TRACEY Painter  ________________________________________________________________________  I have included a copy of your lab results and/or radiologic studies from today's visit so you can have them easily available at your follow-up visit. We hope you feel better and please do not hesitate to contact the ED if you have any questions at all!    Recent Results (from the past 12 hour(s))   EKG 12 Lead    Collection Time: 12/26/24  8:48 AM   Result Value Ref Range    Ventricular Rate 95 BPM    Atrial Rate 95 BPM    P-R Interval 132 ms    QRS Duration 70 ms    Q-T Interval 362 ms    QTc Calculation (Bazett) 454 ms    P Axis 49 degrees    R Axis -22 degrees    T Axis 5 degrees    Diagnosis       Normal sinus rhythm  Normal ECG  When compared with ECG of 15-APR-2022 09:00,  No significant change was found     COVID-19 & Influenza Combo    Collection Time: 12/26/24  9:04 AM    Specimen: Nasopharyngeal   Result Value Ref Range    Source Nasopharyngeal      SARS-CoV-2, PCR Not detected NOTD      Rapid Influenza A By PCR Not detected NOTD      Rapid Influenza B By PCR Not detected NOTD       XR CHEST PORTABLE   Final Result      No acute process on portable chest.         Electronically signed by Eulalio Kim          The exam and treatment you received in the Emergency Department were for an urgent problem and are not intended as complete care. It is important that you follow up with a

## 2024-12-26 NOTE — ED PROVIDER NOTES
[facility] EMERGENCY DEPT  EMERGENCY DEPARTMENT ENCOUNTER       Pt Name: Jaz Rosas  MRN: [id number]  Birthdate 1985  Date of evaluation: 12/26/2024  Provider: TRACEY Painter   PCP: No primary care provider on file.  Note Started: 9:22 AM EST 12/26/24     CHIEF COMPLAINT       Chief Complaint   Patient presents with    Cough     Patient ambulatory to triage w c/o acute onset of a cough reports the cough is now making her chest hurt.      HISTORY OF PRESENT ILLNESS: 1 or more elements      History From: Patient  HPI Limitations: None     Jaz Rosas is a 39 y.o. female who presents by POV for evaluation of upper respiratory complaints.  She started feeling ill 2 days ago on [place].  She reports a productive cough, wheezing, congestion, and chills.  She denies fever.  She has a sore throat but only when coughing.  There is been no shortness of breath.  She has been using her inhaler with relief.  She is also taken Robitussin.  She reports that her son is sick with similar complaint but was not evaluated medically.  Patient is a dialysis tech.     Nursing Notes were all reviewed and agreed with or any disagreements were addressed in the HPI.     REVIEW OF SYSTEMS      Review of Systems     Positives and Pertinent negatives as per HPI.    PAST HISTORY     Past Medical History:  Past Medical History:   Diagnosis Date    Asthma     Other ill-defined conditions(459.89)     eczema       Past Surgical History:  Past Surgical History:   Procedure Laterality Date    [place] UNLISTED PROCEDURE ABDOMEN PERITONEUM & OMENTUM      hernia       Family History:  No family history on file.    Social History:  Social History     Tobacco Use    Smoking status: Never    Smokeless tobacco: Never   Substance Use Topics    Alcohol use: No    Drug use: No       Allergies:  No Known Allergies    CURRENT MEDICATIONS      Previous Medications    ACETAMINOPHEN (TYLENOL) 500 MG TABLET    Take 2 tablets by mouth 3 times daily as

## 2024-12-26 NOTE — ED NOTES
Patient discharged by TRACEY Rascon. Ambulatory out of ED with steady gait, no acute distress noted.

## 2024-12-27 LAB
EKG ATRIAL RATE: 95 BPM
EKG DIAGNOSIS: NORMAL
EKG P AXIS: 49 DEGREES
EKG P-R INTERVAL: 132 MS
EKG Q-T INTERVAL: 362 MS
EKG QRS DURATION: 70 MS
EKG QTC CALCULATION (BAZETT): 454 MS
EKG R AXIS: -22 DEGREES
EKG T AXIS: 5 DEGREES
EKG VENTRICULAR RATE: 95 BPM

## 2025-04-26 ENCOUNTER — HOSPITAL ENCOUNTER (EMERGENCY)
Facility: HOSPITAL | Age: 40
Discharge: HOME | End: 2025-04-26
Payer: COMMERCIAL

## 2025-04-26 ENCOUNTER — APPOINTMENT (OUTPATIENT)
Facility: HOSPITAL | Age: 40
End: 2025-04-26
Payer: COMMERCIAL

## 2025-04-26 VITALS
TEMPERATURE: 98.1 F | DIASTOLIC BLOOD PRESSURE: 84 MMHG | RESPIRATION RATE: 20 BRPM | HEART RATE: 111 BPM | WEIGHT: 272.49 LBS | OXYGEN SATURATION: 98 % | SYSTOLIC BLOOD PRESSURE: 150 MMHG | HEIGHT: 61 IN | BODY MASS INDEX: 51.45 KG/M2

## 2025-04-26 DIAGNOSIS — K11.21 ACUTE PAROTITIS: Primary | ICD-10-CM

## 2025-04-26 LAB
ANION GAP SERPL CALC-SCNC: 6 MMOL/L (ref 2–12)
BASOPHILS # BLD: 0.04 K/UL (ref 0–0.1)
BASOPHILS NFR BLD: 0.3 % (ref 0–1)
BUN SERPL-MCNC: 9 MG/DL (ref 6–20)
BUN/CREAT SERPL: 14 (ref 12–20)
CALCIUM SERPL-MCNC: 8.7 MG/DL (ref 8.5–10.1)
CHLORIDE SERPL-SCNC: 109 MMOL/L (ref 97–108)
CO2 SERPL-SCNC: 23 MMOL/L (ref 21–32)
CREAT SERPL-MCNC: 0.66 MG/DL (ref 0.55–1.02)
DIFFERENTIAL METHOD BLD: ABNORMAL
EOSINOPHIL # BLD: 0.12 K/UL (ref 0–0.4)
EOSINOPHIL NFR BLD: 1 % (ref 0–7)
ERYTHROCYTE [DISTWIDTH] IN BLOOD BY AUTOMATED COUNT: 15.4 % (ref 11.5–14.5)
GLUCOSE SERPL-MCNC: 85 MG/DL (ref 65–100)
HCT VFR BLD AUTO: 36.9 % (ref 35–47)
HGB BLD-MCNC: 12.5 G/DL (ref 11.5–16)
IMM GRANULOCYTES # BLD AUTO: 0.05 K/UL (ref 0–0.04)
IMM GRANULOCYTES NFR BLD AUTO: 0.4 % (ref 0–0.5)
LYMPHOCYTES # BLD: 1.64 K/UL (ref 0.8–3.5)
LYMPHOCYTES NFR BLD: 14.3 % (ref 12–49)
MCH RBC QN AUTO: 25.9 PG (ref 26–34)
MCHC RBC AUTO-ENTMCNC: 33.9 G/DL (ref 30–36.5)
MCV RBC AUTO: 76.4 FL (ref 80–99)
MONOCYTES # BLD: 0.74 K/UL (ref 0–1)
MONOCYTES NFR BLD: 6.4 % (ref 5–13)
NEUTS SEG # BLD: 8.89 K/UL (ref 1.8–8)
NEUTS SEG NFR BLD: 77.6 % (ref 32–75)
NRBC # BLD: 0 K/UL (ref 0–0.01)
NRBC BLD-RTO: 0 PER 100 WBC
PLATELET # BLD AUTO: 401 K/UL (ref 150–400)
PMV BLD AUTO: 9.2 FL (ref 8.9–12.9)
POTASSIUM SERPL-SCNC: 3.6 MMOL/L (ref 3.5–5.1)
RBC # BLD AUTO: 4.83 M/UL (ref 3.8–5.2)
SODIUM SERPL-SCNC: 138 MMOL/L (ref 136–145)
WBC # BLD AUTO: 11.5 K/UL (ref 3.6–11)

## 2025-04-26 PROCEDURE — 6370000000 HC RX 637 (ALT 250 FOR IP)

## 2025-04-26 PROCEDURE — 80048 BASIC METABOLIC PNL TOTAL CA: CPT

## 2025-04-26 PROCEDURE — 70487 CT MAXILLOFACIAL W/DYE: CPT

## 2025-04-26 PROCEDURE — 6360000004 HC RX CONTRAST MEDICATION: Performed by: RADIOLOGY

## 2025-04-26 PROCEDURE — 36415 COLL VENOUS BLD VENIPUNCTURE: CPT

## 2025-04-26 PROCEDURE — 99285 EMERGENCY DEPT VISIT HI MDM: CPT

## 2025-04-26 PROCEDURE — 85025 COMPLETE CBC W/AUTO DIFF WBC: CPT

## 2025-04-26 RX ORDER — ACETAMINOPHEN 500 MG
1000 TABLET ORAL
Status: COMPLETED | OUTPATIENT
Start: 2025-04-26 | End: 2025-04-26

## 2025-04-26 RX ORDER — IOPAMIDOL 755 MG/ML
100 INJECTION, SOLUTION INTRAVASCULAR
Status: COMPLETED | OUTPATIENT
Start: 2025-04-26 | End: 2025-04-26

## 2025-04-26 RX ADMIN — IOPAMIDOL 100 ML: 755 INJECTION, SOLUTION INTRAVENOUS at 12:05

## 2025-04-26 RX ADMIN — ACETAMINOPHEN 1000 MG: 500 TABLET, FILM COATED ORAL at 11:38

## 2025-04-26 ASSESSMENT — PAIN DESCRIPTION - PAIN TYPE: TYPE: ACUTE PAIN

## 2025-04-26 ASSESSMENT — PAIN SCALES - GENERAL: PAINLEVEL_OUTOF10: 8

## 2025-04-26 ASSESSMENT — PAIN DESCRIPTION - ORIENTATION: ORIENTATION: LEFT

## 2025-04-26 ASSESSMENT — PAIN DESCRIPTION - FREQUENCY: FREQUENCY: CONTINUOUS

## 2025-04-26 ASSESSMENT — PAIN DESCRIPTION - LOCATION: LOCATION: EAR

## 2025-04-26 ASSESSMENT — PAIN DESCRIPTION - DESCRIPTORS: DESCRIPTORS: ACHING;THROBBING

## 2025-04-26 NOTE — DISCHARGE INSTRUCTIONS
Thank You!    It was a pleasure taking care of you in our Emergency Department today. We know that when you come to our Emergency Department, you are entrusting us with your health, comfort, and safety. Our physicians and nurses honor that trust, and truly appreciate the opportunity to care for you and your loved ones.      We also value your feedback. If you receive a survey about your Emergency Department experience today, please fill it out.  We care about our patients' feedback, and we listen to what you have to say.  Thank you.    Colt Castillo MD  ________________________________________________________________________  I have included a copy of your lab results and/or radiologic studies from today's visit so you can have them easily available at your follow-up visit. We hope you feel better and please do not hesitate to contact the ED if you have any questions at all!    Recent Results (from the past 12 hours)   BMP    Collection Time: 04/26/25 11:40 AM   Result Value Ref Range    Sodium 138 136 - 145 mmol/L    Potassium 3.6 3.5 - 5.1 mmol/L    Chloride 109 (H) 97 - 108 mmol/L    CO2 23 21 - 32 mmol/L    Anion Gap 6 2 - 12 mmol/L    Glucose 85 65 - 100 mg/dL    BUN 9 6 - 20 MG/DL    Creatinine 0.66 0.55 - 1.02 MG/DL    BUN/Creatinine Ratio 14 12 - 20      Est, Glom Filt Rate >90 >60 ml/min/1.73m2    Calcium 8.7 8.5 - 10.1 MG/DL   CBC with Auto Differential    Collection Time: 04/26/25 11:40 AM   Result Value Ref Range    WBC 11.5 (H) 3.6 - 11.0 K/uL    RBC 4.83 3.80 - 5.20 M/uL    Hemoglobin 12.5 11.5 - 16.0 g/dL    Hematocrit 36.9 35.0 - 47.0 %    MCV 76.4 (L) 80.0 - 99.0 FL    MCH 25.9 (L) 26.0 - 34.0 PG    MCHC 33.9 30.0 - 36.5 g/dL    RDW 15.4 (H) 11.5 - 14.5 %    Platelets 401 (H) 150 - 400 K/uL    MPV 9.2 8.9 - 12.9 FL    Nucleated RBCs 0.0 0  WBC    nRBC 0.00 0.00 - 0.01 K/uL    Neutrophils % 77.6 (H) 32.0 - 75.0 %    Lymphocytes % 14.3 12.0 - 49.0 %    Monocytes % 6.4 5.0 - 13.0 %

## 2025-04-26 NOTE — ED TRIAGE NOTES
Pt presents ambulatory to ED via triage for c/o left ear pain that started yesterday. Then today, pt woke up with swelling on the left side of her face. Pt endorses difficulty eating due to the pain.

## 2025-04-26 NOTE — ED PROVIDER NOTES
[facility] [place] [facility] EMERGENCY DEPARTMENT  EMERGENCY DEPARTMENT ENCOUNTER       Pt Name: Jaz Rosas  MRN: [id number]  Birthdate 1985  Date of evaluation: 4/26/2025  Provider: Mike Jimenez MD   PCP: Evelin Jones MD  Note Started: 11:15 AM EDT 4/26/25  Attending Physician: Dr. Castillo    CHIEF COMPLAINT       Chief Complaint   Patient presents with    Ear Pain    Facial Swelling     Pt presents ambulatory to ED via triage for c/o left ear pain that started yesterday. Then today, pt woke up with swelling on the left side of her face. Pt endorses difficulty eating due to the pain.          HISTORY OF PRESENT ILLNESS: 1 or more elements      History From: Patient, History limited by: none     Jaz Rosas is a 40 y.o. female with history include asthma and seasonal allergies presenting for left facial pain.  Reports sudden onset yesterday while eating with left jaw pain and slight swelling.  Worsening overnight with increase in swelling and pain causing discomfort with chewing and eating.  Denies trauma, rash, fevers, chills, nausea, vomiting.  Denies change in voice, difficulty swallowing, difficulty speaking, difficulty breathing.  Patient with known tooth needing extraction on the right lower jaw, but denies known dental pathology on the left.       Please See MDM for Additional Details of the HPI/PMH  Nursing Notes were all reviewed and agreed with or any disagreements were addressed in the HPI.     REVIEW OF SYSTEMS        Positives and Pertinent negatives as per HPI.    PAST HISTORY     Past Medical History:  Past Medical History:   Diagnosis Date    Asthma     Other ill-defined conditions(589.89)     eczema       Past Surgical History:  Past Surgical History:   Procedure Laterality Date    [place] UNLISTED PROCEDURE ABDOMEN PERITONEUM & OMENTUM      hernia       Family History:  History reviewed. No pertinent family history.    Social History:  Social History     Tobacco Use    Smoking status: Never     information regarding their diagnosis and treatment, and list of reasons why they would want to return to the ED prior to their follow-up appointment, should her condition change.     CLINICAL IMPRESSION    Discharge Note: The patient is stable for discharge home. The signs, symptoms, diagnosis, and discharge instructions have been discussed, understanding conveyed, and agreed upon. The patient is to follow up as recommended or return to ER should their symptoms worsen.      PATIENT REFERRED TO:  [facility]  [address] [facility]   [facility] [address]        [facility]  [address] [facility] [address] [phone]  Call          DISCHARGE MEDICATIONS:     Medication List        START taking these medications      amoxicillin-clavulanate 875-125 MG per tablet  Commonly known as: AUGMENTIN  Take 1 tablet by mouth 2 times daily for 7 days            ASK your doctor about these medications      acetaminophen 500 MG tablet  Commonly known as: TYLENOL  Take 2 tablets by mouth 3 times daily as needed for Pain     albuterol sulfate  (90 Base) MCG/ACT inhaler  Commonly known as: Ventolin HFA  Inhale 2 puffs into the lungs 4 times daily as needed for Wheezing     ibuprofen 600 MG tablet  Commonly known as: ADVIL;MOTRIN  Take 1 tablet by mouth every 6 hours as needed for Pain     Mucinex Maximum Strength 1200 MG Tb12  Generic drug: guaiFENesin               Where to Get Your Medications        These medications were sent to [facility] [address] [facility] [address] [place] [address] RD - P [phone] - F [phone]  [address] [place] [id number]      Phone: [phone]   amoxicillin-clavulanate 875-125 MG per tablet           DISCONTINUED MEDICATIONS:  Discharge Medication List as of 4/26/2025  1:18 PM          Case discussed with attending physician of record above.  Mike Jimenez MD (electronically signed)    (Please note that parts of this dictation were completed with voice